# Patient Record
Sex: FEMALE | Race: WHITE | NOT HISPANIC OR LATINO | Employment: FULL TIME | URBAN - METROPOLITAN AREA
[De-identification: names, ages, dates, MRNs, and addresses within clinical notes are randomized per-mention and may not be internally consistent; named-entity substitution may affect disease eponyms.]

---
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
## 2025-02-17 DIAGNOSIS — M25.561 RIGHT KNEE PAIN, UNSPECIFIED CHRONICITY: Primary | ICD-10-CM

## 2025-02-18 ENCOUNTER — TELEPHONE (OUTPATIENT)
Dept: ORTHOPEDICS | Facility: CLINIC | Age: 20
End: 2025-02-18
Payer: COMMERCIAL

## 2025-02-18 NOTE — TELEPHONE ENCOUNTER
----- Message from Barbi Argenis sent at 2/18/2025 12:58 PM CST -----  Contact: Pts mother    ----- Message -----  From: Minda Florez  Sent: 2/18/2025  12:53 PM CST  To: [facility] Clinical Staff    .Type:  Patient Returning CallWho Called: Ifeoma /mother Who Left Message for Patient: inna Does the patient know what this is regarding?: unknown Would the patient rather a call back or a response via [facility]?  Call Best Call Back Number: [phone] Information:

## 2025-02-20 ENCOUNTER — HOSPITAL ENCOUNTER (OUTPATIENT)
Dept: RADIOLOGY | Facility: HOSPITAL | Age: 20
Discharge: HOME | End: 2025-02-20
Payer: COMMERCIAL

## 2025-02-20 ENCOUNTER — OFFICE VISIT (OUTPATIENT)
Dept: ORTHOPEDICS | Facility: CLINIC | Age: 20
End: 2025-02-20
Payer: COMMERCIAL

## 2025-02-20 VITALS — HEIGHT: 62 IN | WEIGHT: 130.06 LBS | BODY MASS INDEX: 23.93 KG/M2

## 2025-02-20 DIAGNOSIS — M25.561 RIGHT KNEE PAIN, UNSPECIFIED CHRONICITY: ICD-10-CM

## 2025-02-20 DIAGNOSIS — M25.562 PATELLAR PAIN, LEFT: ICD-10-CM

## 2025-02-20 DIAGNOSIS — M76.52 PATELLAR TENDINITIS OF LEFT KNEE: ICD-10-CM

## 2025-02-20 DIAGNOSIS — M25.562 CHRONIC PAIN OF LEFT KNEE: Primary | ICD-10-CM

## 2025-02-20 DIAGNOSIS — G89.29 CHRONIC PAIN OF LEFT KNEE: Primary | ICD-10-CM

## 2025-02-20 PROCEDURE — 73564 X-RAY EXAM KNEE 4 OR MORE: CPT | Mod: TC,LT

## 2025-02-20 RX ORDER — METHYLPREDNISOLONE 4 MG/1
2 TABLET ORAL DAILY
COMMUNITY
Start: 2024-09-17

## 2025-02-20 RX ORDER — CELECOXIB 200 MG/1
200 CAPSULE ORAL DAILY
COMMUNITY
Start: 2024-12-18

## 2025-02-20 RX ORDER — DICLOFENAC SODIUM 75 MG/1
75 TABLET, DELAYED RELEASE ORAL 2 TIMES DAILY PRN
Qty: 60 TABLET | Refills: 1 | Status: SHIPPED | OUTPATIENT
Start: 2025-02-20

## 2025-02-20 NOTE — PROGRESS NOTES
Cedric Waller PA-C  Orthopedic Surgery   [address] [facility] [place] [address]       Chief Complaint  Chief Complaint   Patient presents with    Left Knee - Pain     4/10         History of Present Illness  19-year-old female who presents with complaints of left knee pain onset roughly 6 months ago.  She denies any specific trauma or injury prior to onset.  Her pain is primarily anterolateral and inferior to the patella.  She has previously seen another orthopedist for this and underwent physical therapy, J bracing, NSAIDs, intra-articular steroid injection.  Nothing has provided lasting relief.  The injection provided maybe 1 month of relief.  She continues with 10/10 pain at times.  Describes her pain as dull and achy.  The pain wakes her up at night.  She is currently taking Celebrex 200 mg PRN.       Review of Systems  Review of Systems   Constitutional:  Negative for chills and fever.   Respiratory:  Negative for shortness of breath.    Cardiovascular:  Negative for chest pain.   All other systems reviewed and are negative.       Past Medical History  Past Medical History:   Diagnosis Date    Asthma 2021 2:42:33 PM    [facility] Historical - Respiratory: Asthma-No Additional Notes    Asthma 2021 2:42:33 PM    [facility] Historical - Respiratory: Asthma-No Additional Notes    Non- jaundice 2021 2:42:30 PM    [facility] Historical - Quick Add: Jaundice-No Additional Notes    Non- jaundice 2021 2:42:30 PM    [facility] Historical - Quick Add: Jaundice-No Additional Notes       Past Surgical History  Past Surgical History:   Procedure Laterality Date    TONSILLECTOMY         Allergies  Review of patient's allergies indicates:  No Known Allergies    Family History  No family history on file.    Vital Signs  There were no vitals filed for this visit.  Body mass index is 23.79 kg/m².    Physical Exam  Physical Exam  Constitutional:       General: She  is not in acute distress.     Appearance: Normal appearance. She is not ill-appearing.   Pulmonary:      Effort: Pulmonary effort is normal. No respiratory distress.   Musculoskeletal:      Left knee: No swelling, deformity, effusion, erythema, ecchymosis, bony tenderness or crepitus. Normal range of motion. Tenderness present over the patellar tendon. No medial joint line or lateral joint line tenderness. No LCL laxity or MCL laxity.Normal alignment and normal patellar mobility. Normal pulse.      Left lower leg: Normal.      Comments: Strength full in LLE grossly.   Neurovascular status grossly intact.   Compartments soft.    Neurological:      General: No focal deficit present.      Mental Status: She is alert and oriented to person, place, and time.      Sensory: Sensation is intact.      Motor: Motor function is intact.   Psychiatric:         Mood and Affect: Mood normal.         Thought Content: Thought content normal.         Judgment: Judgment normal.            Imaging  X-ray Knee Ortho Left with Flexion (XPD)  Narrative: EXAMINATION:  XR KNEE ORTHO LEFT WITH FLEXION (XPD)    CLINICAL HISTORY:  . Pain in right knee    TECHNIQUE:  AP standing view of both knees, PA flexion standing views of both knees, and Merchant views of both knees were performed. A lateral view of the left knee was also performed.    COMPARISON:  None    FINDINGS:  No acute osseous or soft tissue abnormality.  Impression: As above    Electronically signed by: Justus Rod MD  Date:    02/20/2025  Time:    13:06        ASSESSMENT: 19 y.o. female  with:   1. Chronic pain of left knee  -     diclofenac (VOLTAREN) 75 MG EC tablet; Take 1 tablet (75 mg total) by mouth 2 (two) times daily as needed.  Dispense: 60 tablet; Refill: 1    2. Patellar pain, left    3. Patellar tendinitis of left knee         PLAN:  Data:  I reviewed available old/outside records.  Multiple office visit notes from [facility].   I independently visualized xray images  "today.   Interpretation:  X-ray of the left knee today at [facility] demonstrates well-maintained joint spaces.  No acute fracture or dislocation.  Advanced imaging:  MRI of left knee impression from office visit on 12/18/2024 at [facility] indicating "Lateral patellar tilting without subluxation.  Normal TT TG distance.  No soft tissue swelling.  Minimal medial meniscus intermittent signal without tear.  Otherwise, unremarkable MRI of the left knee."  PT/OT:  Failed physical therapy  Medications:  Stop Celebrex   Start diclofenac 75 BID PRN  DME and weightbearing status:  Continue knee bracing  Weightbear as tolerated  Injections/Procedures:  Left knee intra-articular steroid injection on 12/18/2024 provided roughly 1 month of relief  Referral:  Referred to Dr. Matthews today  Previously seen Dr. Hernandez  Education:  I had a long discussion with the patient about the causes, treatments, and prognosis/natural history for their symptoms. We discussed effective ways to improve symptoms as well as what types of activities may make the symptoms/prognosis worse. We discussed signs and symptoms and other reasons to return to clinic sooner.   Return to clinic:  Follow up for Evaluation with Dr. Matthews.  His staff will schedule..         Cedric Waller PA-C  Orthopedic Surgery  "

## 2025-02-25 ENCOUNTER — PATIENT MESSAGE (OUTPATIENT)
Dept: SPORTS MEDICINE | Facility: CLINIC | Age: 20
End: 2025-02-25
Payer: COMMERCIAL

## 2025-02-27 ENCOUNTER — OFFICE VISIT (OUTPATIENT)
Dept: SPORTS MEDICINE | Facility: CLINIC | Age: 20
End: 2025-02-27
Payer: COMMERCIAL

## 2025-02-27 VITALS — WEIGHT: 130.06 LBS | BODY MASS INDEX: 23.93 KG/M2 | HEIGHT: 62 IN

## 2025-02-27 DIAGNOSIS — G89.29 CHRONIC PATELLOFEMORAL PAIN OF LEFT KNEE: Primary | ICD-10-CM

## 2025-02-27 DIAGNOSIS — M84.38XA STRESS FRACTURE OF OTHER SITE, INITIAL ENCOUNTER: ICD-10-CM

## 2025-02-27 DIAGNOSIS — M25.562 CHRONIC PATELLOFEMORAL PAIN OF LEFT KNEE: Primary | ICD-10-CM

## 2025-02-27 PROCEDURE — 99999 PR PBB SHADOW E&M-EST. PATIENT-LVL III: CPT | Mod: PBBFAC,,, | Performed by: ORTHOPAEDIC SURGERY

## 2025-02-27 NOTE — PATIENT INSTRUCTIONS
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    Chronic patellofemoral pain of left knee Yes    Stress fracture of other site, initial encounter       Plan:  MRI of left knee: include axial series of both T1 and T2 thin slicing  CT of left knee    Follow-up: AFTER IMAGING. Please reach out to us sooner if there are any problems between now and then.    About Dr. Cassie Matthews's Research & Publications    Give us Feedback:   Google: Leave Google Review  Healthgrades: Leave Healthgrades Review    After Hours Number: [phone]

## 2025-02-27 NOTE — PROGRESS NOTES
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 Pain is located in the anterior aspect of the knee, just below the patella, exacerbated by standing, walking, and pressure. She works as a pharmacy technician, requiring frequent standing. Pain wakes her up at night and persists even after sitting or lying down following prolonged standing. She rates her improvement as 0% since symptom onset.    She has undergone various treatments since September, initially seen by Dr. Hernandez. Treatments include injections, physical therapy, and using a J brace, all reported as ineffective. She was initially prescribed Celebrex, later switched to Diclofenac by a new doctor, but reports no difference with the medication change.    She mentions a history of knee trouble during her younger years when she participated in cheer and tumble, though no specific injury is noted. Dang has undergone an MRI, but the quality was reportedly not optimal for diagnosis.    Dang denies any catching, locking, or getting stuck of the knee. Injections: Dang received injections.  Physical therapy: Dang attended sessions 2-3 times per week for approximately 2 months. Exercises included leg lifts, squats, stairs, steps, and use of a machine.  Brace: Dang was given a J brace.  Ice cream: Mentioned as a treatment.    MEDICATIONS:  - Celebrex  - Diclofenac: Dang reports no noticeable difference with this medication  - Anti-inflammatories: Twice daily    WORK STATUS:  - Works as a pharmacy technician  - Job requires standing for long periods,  exacerbating knee pain  - Has been working in this position for two years  - Knee pain affecting ability to work comfortably      ROS:  Musculoskeletal: +joint pain  Neurological: +difficulty walking         Past Medical History:   Past Medical History:   Diagnosis Date    Asthma 2021 2:42:33 PM    [facility] Historical - Respiratory: Asthma-No Additional Notes    Asthma 2021 2:42:33 PM    [facility] Historical - Respiratory: Asthma-No Additional Notes    Non- jaundice 2021 2:42:30 PM    [facility] Historical - Quick Add: Jaundice-No Additional Notes    Non- jaundice 2021 2:42:30 PM    [facility] Historical - Quick Add: Jaundice-No Additional Notes     Past Surgical History:   Procedure Laterality Date    TONSILLECTOMY       No family history on file.  Social History[1]  Medication List with Changes/Refills   Current Medications    BUSPIRONE (BUSPAR) 10 MG TABLET    Take 1 tablet (10 mg total) by mouth 3 (three) times daily as needed (anxiety).    CELECOXIB (CELEBREX) 200 MG CAPSULE    Take 200 mg by mouth once daily.    DICLOFENAC (VOLTAREN) 75 MG EC TABLET    Take 1 tablet (75 mg total) by mouth 2 (two) times daily as needed.    METHYLPREDNISOLONE (MEDROL DOSEPACK) 4 MG TABLET    Take 2 mg by mouth once daily.    NORETHINDRONE-E.ESTRADIOL-IRON (LO LOESTRIN FE) 1 MG-10 MCG (24)/10 MCG (2) TAB    Take 1 tablet by mouth once daily.    SERTRALINE (ZOLOFT) 100 MG TABLET    1.5 tablets daily by mouth     Review of patient's allergies indicates:  No Known Allergies  ROS    Physical Exam:   Body mass index is 23.79 kg/m².  There were no vitals filed for this visit.   GENERAL: Well appearing, appropriate for stated age, no acute distress.  CARDIOVASCULAR: Pulses regular by peripheral palpation.  PULMONARY: Respirations are even and non-labored.  NEURO: Awake, alert, and oriented x 3.  PSYCH: Mood & affect are appropriate.  HEENT: Head is normocephalic and  atraumatic.              Left Knee Exam     Inspection   Scars: absent  Effusion: absent    Tenderness   The patient tender to palpation of the patellar tendon and patella (inferior pole of patella, proximal patella tendon).    Range of Motion   The patient has normal left knee ROM.  Extension:  0   Flexion:  120     Tests   Meniscus   Talat:  Medial - negative Lateral - negative  Stability   Lachman: normal (-1 to 2mm)   MCL - Valgus: normal (0 to 2mm)  LCL - Varus: normal (0 to 2mm)  Patella   Patellar Grind: positive  J-Sign: J sign present    Other   Sensation: normal    Comments:  Intact EHL, FHL, gastrocsoleus, and tibialis anterior. Sensation intact to light touch in superficial peroneal, deep peroneal, tibial, sural, and saphenous nerve distributions. Foot warm and well perfused with capillary refill of less than 2 seconds and palpable pedal pulses.        Muscle Strength   Left Lower Extremity   Hip Abduction: 5/5   Quadriceps:  5/5   Hamstrin/5     Vascular Exam       Left Pulses  Dorsalis Pedis:      2+  Posterior Tibial:      2+      Physical Exam    Right Knee: J SIGN PRESENT IN KNEE TRACKING.  IMAGING:  - MRI: Quality reported as suboptimal, lacking some desired slices and cuts             Imaging:    X-ray Knee Ortho Left with Flexion (XPD)  Narrative: EXAMINATION:  XR KNEE ORTHO LEFT WITH FLEXION (XPD)    CLINICAL HISTORY:  . Pain in right knee    TECHNIQUE:  AP standing view of both knees, PA flexion standing views of both knees, and Merchant views of both knees were performed. A lateral view of the left knee was also performed.    COMPARISON:  None    FINDINGS:  No acute osseous or soft tissue abnormality.  Impression: As above    Electronically signed by: Justus Rod MD  Date:    2025  Time:    13:06        Relevant imaging results reviewed and interpreted by me, discussed with the patient and / or family today.     Other Tests:         Assessment & Plan    PLAN SUMMARY:   MRI  and CT of the knee ordered   Follow-up appointment after obtaining new imaging studies   Dang to contact office to schedule follow-up, possibly as early as Monday    KNEE DISORDER:   Ordered MRI and CT of the knee.    FOLLOW-UP:   Follow up after obtaining new imaging studies.   Contact the office to schedule a follow-up visit, possibly as soon as Monday.             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    Chronic patellofemoral pain of left knee Yes    Stress fracture of other site, initial encounter         Plan:  MRI of left knee: include axial series of both T1 and T2 thin slicing  CT of left knee    Follow-up: AFTER IMAGING. Please reach out to us sooner if there are any problems between now and then.    About Dr. Cassie Matthews's Research & Publications    Give us Feedback:   Google: Leave Google Review  Healthgrades: Leave Healthgrades Review    After Hours Number: [phone]        Provider Note/Medical Decision Making:  Recalcitrant knee pain.  Have a previous MRI of poor quality from outside facility.  Concern for a stress reaction or stress fracture.      I discussed worrisome and red flag signs and symptoms with the patient. The patient expressed understanding and agreed to alert me immediately or to go to the emergency room if they experience any of these.   Treatment plan was developed with input from the patient/family, and they expressed understanding and agreement with the plan. All questions were answered today.          Yovanny Matthews MD  Orthopaedic Surgery & Sports Medicine       Disclaimer: This note was prepared using a voice  recognition system and is likely to have sound alike errors within the text.     This note was generated with the assistance of ambient listening technology. Verbal consent was obtained by the patient and accompanying visitor(s) for the recording of patient appointment to facilitate this note. I attest to having reviewed and edited the generated note for accuracy, though some syntax or spelling errors may persist. Please contact the author of this note for any clarification.    I, Ludmila Camilo, acted as a scribe for Yovanny Matthews MD for the duration of this office visit.           [1]   Social History  Socioeconomic History    Marital status: Single   Tobacco Use    Smoking status: Never     Passive exposure: Never    Smokeless tobacco: Never   Substance and Sexual Activity    Alcohol use: Never    Drug use: Never    Sexual activity: Not Currently     Partners: Male     Social Drivers of Health     Financial Resource Strain: Low Risk  (3/5/2024)    Overall Financial Resource Strain (CARDIA)     Difficulty of Paying Living Expenses: Not hard at all   Food Insecurity: No Food Insecurity (3/5/2024)    Hunger Vital Sign     Worried About Running Out of Food in the Last Year: Never true     Ran Out of Food in the Last Year: Never true   Transportation Needs: No Transportation Needs (3/5/2024)    PRAPARE - Transportation     Lack of Transportation (Medical): No     Lack of Transportation (Non-Medical): No   Physical Activity: Inactive (3/5/2024)    Exercise Vital Sign     Days of Exercise per Week: 0 days     Minutes of Exercise per Session: 0 min   Stress: Stress Concern Present (3/5/2024)    [employer] - Occupational Stress Questionnaire     Feeling of Stress : To some extent   Housing Stability: Low Risk  (3/5/2024)    Housing Stability Vital Sign     Unable to Pay for Housing in the Last Year: No     Number of Places Lived in the Last Year: 1     Unstable Housing in the Last  Year: No

## 2025-02-28 ENCOUNTER — HOSPITAL ENCOUNTER (OUTPATIENT)
Dept: RADIOLOGY | Facility: HOSPITAL | Age: 20
Discharge: HOME | End: 2025-02-28
Attending: ORTHOPAEDIC SURGERY
Payer: COMMERCIAL

## 2025-02-28 DIAGNOSIS — G89.29 CHRONIC PATELLOFEMORAL PAIN OF LEFT KNEE: ICD-10-CM

## 2025-02-28 DIAGNOSIS — M25.562 CHRONIC PATELLOFEMORAL PAIN OF LEFT KNEE: ICD-10-CM

## 2025-02-28 PROCEDURE — 73721 MRI JNT OF LWR EXTRE W/O DYE: CPT | Mod: 26,LT,, | Performed by: RADIOLOGY

## 2025-02-28 PROCEDURE — 73721 MRI JNT OF LWR EXTRE W/O DYE: CPT | Mod: TC,PN,LT

## 2025-02-28 PROCEDURE — 73700 CT LOWER EXTREMITY W/O DYE: CPT | Mod: TC,PN,LT

## 2025-02-28 PROCEDURE — 73700 CT LOWER EXTREMITY W/O DYE: CPT | Mod: 26,LT,, | Performed by: RADIOLOGY

## 2025-03-03 ENCOUNTER — TELEPHONE (OUTPATIENT)
Dept: INTERNAL MEDICINE | Facility: CLINIC | Age: 20
End: 2025-03-03
Payer: COMMERCIAL

## 2025-03-03 NOTE — TELEPHONE ENCOUNTER
I called pt per Dr Paige request to get pt scheduled to see Dr Paige or Chloe NP for an anxiety visit but there were no answer to pt phone . I left pt a vm to call the office back to get scheduled

## 2025-03-04 ENCOUNTER — OFFICE VISIT (OUTPATIENT)
Dept: INTERNAL MEDICINE | Facility: CLINIC | Age: 20
End: 2025-03-04
Payer: COMMERCIAL

## 2025-03-04 VITALS
OXYGEN SATURATION: 99 % | BODY MASS INDEX: 24.3 KG/M2 | DIASTOLIC BLOOD PRESSURE: 64 MMHG | SYSTOLIC BLOOD PRESSURE: 110 MMHG | WEIGHT: 132.06 LBS | HEART RATE: 81 BPM | RESPIRATION RATE: 16 BRPM | TEMPERATURE: 99 F | HEIGHT: 62 IN

## 2025-03-04 DIAGNOSIS — F41.1 GAD (GENERALIZED ANXIETY DISORDER): Primary | ICD-10-CM

## 2025-03-04 DIAGNOSIS — F33.1 MODERATE EPISODE OF RECURRENT MAJOR DEPRESSIVE DISORDER: ICD-10-CM

## 2025-03-04 DIAGNOSIS — Z00.00 ENCOUNTER FOR SCREENING AND PREVENTATIVE CARE: ICD-10-CM

## 2025-03-04 DIAGNOSIS — N92.1 PROLONGED MENSTRUATION: ICD-10-CM

## 2025-03-04 DIAGNOSIS — D50.9 IRON DEFICIENCY ANEMIA, UNSPECIFIED IRON DEFICIENCY ANEMIA TYPE: ICD-10-CM

## 2025-03-04 DIAGNOSIS — Z13.31 POSITIVE SCREENING FOR DEPRESSION ON 9-ITEM PATIENT HEALTH QUESTIONNAIRE (PHQ-9): ICD-10-CM

## 2025-03-04 PROCEDURE — 3078F DIAST BP <80 MM HG: CPT | Mod: CPTII,S$GLB,,

## 2025-03-04 PROCEDURE — 3074F SYST BP LT 130 MM HG: CPT | Mod: CPTII,S$GLB,,

## 2025-03-04 PROCEDURE — 99213 OFFICE O/P EST LOW 20 MIN: CPT | Mod: S$GLB,,,

## 2025-03-04 PROCEDURE — 1160F RVW MEDS BY RX/DR IN RCRD: CPT | Mod: CPTII,S$GLB,,

## 2025-03-04 PROCEDURE — 1159F MED LIST DOCD IN RCRD: CPT | Mod: CPTII,S$GLB,,

## 2025-03-04 PROCEDURE — 99999 PR PBB SHADOW E&M-EST. PATIENT-LVL IV: CPT | Mod: PBBFAC,,,

## 2025-03-04 PROCEDURE — 3008F BODY MASS INDEX DOCD: CPT | Mod: CPTII,S$GLB,,

## 2025-03-04 NOTE — PROGRESS NOTES
"Subjective:       Patient ID: Dang Blandon is a 19 y.o. female.    Chief Complaint: Anxiety    History of Present Illness    CHIEF COMPLAINT:  Patient presents today for follow-up on depression and anxiety management    DEPRESSION AND ANXIETY:  She reports improvement in depression symptoms since increasing Zoloft dosage. She takes Buspar 10 mg as needed for anticipated stressful situations, though she acknowledges misunderstanding the medication's intended use, having believed it was only for known upcoming stressful events. She denies regular use of Buspar.    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 She reports increased stress and irritability following this career advancement.          /64 (BP Location: Left arm, Patient Position: Sitting)   Pulse 81   Temp 98.8 °F (37.1 °C) (Tympanic)   Resp 16   Ht 5' 2" (1.575 m)   Wt 59.9 kg (132 lb 0.9 oz)   LMP 02/23/2025 (Exact Date)   SpO2 99%   BMI 24.15 kg/m²     Review of Systems   Constitutional:  Negative for chills and fever.   Eyes:  Negative for visual disturbance.   Respiratory:  Negative for chest tightness and shortness of breath.    Cardiovascular:  Negative for chest pain, palpitations and leg swelling.   Gastrointestinal:  Negative for constipation, diarrhea, nausea and vomiting.   Genitourinary:  Negative for difficulty urinating.   Neurological:  Negative for headaches.   Psychiatric/Behavioral:  The patient is nervous/anxious.    All other systems reviewed and are negative.      Objective:      Physical Exam  Vitals reviewed.   Constitutional:       General: She is not in acute distress.     Appearance: Normal appearance. She is not ill-appearing or toxic-appearing.   HENT:      Head: Normocephalic and atraumatic.   Eyes:      Pupils: Pupils are equal, round, and reactive to light.   Cardiovascular:      Rate and Rhythm: Normal rate and regular " rhythm.   Pulmonary:      Effort: Pulmonary effort is normal.      Breath sounds: Normal breath sounds.   Abdominal:      General: Bowel sounds are normal.      Palpations: Abdomen is soft.   Musculoskeletal:         General: Normal range of motion.      Cervical back: Normal range of motion and neck supple.   Skin:     General: Skin is warm and dry.   Neurological:      General: No focal deficit present.      Mental Status: She is alert and oriented to person, place, and time.   Psychiatric:         Mood and Affect: Mood normal.         Behavior: Behavior normal.         Thought Content: Thought content normal.         Judgment: Judgment normal.         Assessment:       1. LUCINDA (generalized anxiety disorder)    2. Moderate episode of recurrent major depressive disorder      LUCINDA-7 score of 12 = moderately severe anxiety  PHQ-9 score of 9 = moderate depression  Plan:       Dang was seen today for anxiety.    Diagnoses and all orders for this visit:    LUCINDA (generalized anxiety disorder)    Moderate episode of recurrent major depressive disorder    Assessment & Plan    IMPRESSION:  - Assessed patient's current medication regimen and anxiety symptoms  - Determined Buspar was being underutilized for anxiety management  - Evaluated need for additional interventions such as counseling or therapy    DEPRESSION:  - Continue Zoloft at current dose.  - Monitor the patient's depression and evaluate the effectiveness of Zoloft.  - Patient reports that increasing the Zoloft dosage has been helpful in managing depression.  - Suggest counseling or therapy as an additional treatment option for depression.    ANXIETY:  - Increase Buspar to 2-3 times daily (morning and night, or breakfast, lunch, and dinner) for anxiety management.  - Explain that Buspar can be taken up to 3 times daily for anxiety management.  - Monitor the patient's anxiety symptoms, particularly in relation to stressful situations at work.  - Evaluate the  current use of Buspar, noting that the patient is only using it for breakthrough anxiety.  - Assess that the current use of Buspar is not optimal for managing the patient's anxiety.  - Recommend increasing Buspar usage to 2-3 times daily for better anxiety control.  - Reassess the effectiveness of increased Buspar usage before considering changes to Zoloft dosage.    FOLLOW UP:  - Discussed the option of counseling or therapy as a potential resource for additional support.  - Follow up in May for annual follow-up and bloodwork.       Follow up in about 2 months (around 5/4/2025), or if symptoms worsen or fail to improve, for annual with labs.

## 2025-03-04 NOTE — PROGRESS NOTES
Subjective:       Patient ID: Dang Dia Head is a 19 y.o. female.    Chief Complaint: No chief complaint on file.        LMP 02/23/2025 (Exact Date)     Review of Systems    Objective:      Physical Exam    Assessment:       1. Anxiety and depression    2. Prolonged menstruation    3. Iron deficiency anemia, unspecified iron deficiency anemia type    4. Encounter for screening and preventative care    5. LUCINDA (generalized anxiety disorder)    6. Moderate episode of recurrent major depressive disorder        Plan:       Diagnoses and all orders for this visit:    Anxiety and depression  -     TSH; Future    Prolonged menstruation  -     CBC Auto Differential; Future    Iron deficiency anemia, unspecified iron deficiency anemia type  -     CBC Auto Differential; Future    Encounter for screening and preventative care  -     Comprehensive Metabolic Panel; Future  -     TSH; Future  -     Lipid Panel; Future  -     CBC Auto Differential; Future    LUCINDA (generalized anxiety disorder)    Moderate episode of recurrent major depressive disorder      There are no Patient Instructions on file for this visit.

## 2025-03-04 NOTE — PATIENT INSTRUCTIONS
Try taking buspar morning and night, if not morning, lunch, and night to see if anxiety becomes more manageable.

## 2025-03-05 ENCOUNTER — OFFICE VISIT (OUTPATIENT)
Dept: SPORTS MEDICINE | Facility: CLINIC | Age: 20
End: 2025-03-05
Payer: COMMERCIAL

## 2025-03-05 VITALS — BODY MASS INDEX: 24.3 KG/M2 | HEIGHT: 62 IN | WEIGHT: 132.06 LBS

## 2025-03-05 DIAGNOSIS — M23.042 CYST OF ANTERIOR HORN OF LATERAL MENISCUS OF LEFT KNEE: Primary | ICD-10-CM

## 2025-03-05 DIAGNOSIS — M25.562 CHRONIC PATELLOFEMORAL PAIN OF LEFT KNEE: ICD-10-CM

## 2025-03-05 DIAGNOSIS — M84.469A INSUFFICIENCY FRACTURE OF TIBIA, INITIAL ENCOUNTER: ICD-10-CM

## 2025-03-05 DIAGNOSIS — G89.29 CHRONIC PATELLOFEMORAL PAIN OF LEFT KNEE: ICD-10-CM

## 2025-03-05 DIAGNOSIS — T14.8XXA BONE BRUISE: ICD-10-CM

## 2025-03-05 PROCEDURE — 99214 OFFICE O/P EST MOD 30 MIN: CPT | Mod: S$GLB,,, | Performed by: ORTHOPAEDIC SURGERY

## 2025-03-05 PROCEDURE — 3008F BODY MASS INDEX DOCD: CPT | Mod: CPTII,S$GLB,, | Performed by: ORTHOPAEDIC SURGERY

## 2025-03-05 PROCEDURE — 1159F MED LIST DOCD IN RCRD: CPT | Mod: CPTII,S$GLB,, | Performed by: ORTHOPAEDIC SURGERY

## 2025-03-05 PROCEDURE — 99999 PR PBB SHADOW E&M-EST. PATIENT-LVL IV: CPT | Mod: PBBFAC,,, | Performed by: ORTHOPAEDIC SURGERY

## 2025-03-05 NOTE — H&P (VIEW-ONLY)
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 She reports pain in the anteromedial and anterolateral aspects of the knee. Pain is localized to a sensitive area. She indicates tenderness when pressure is applied to the anterior tibial edge.    She has undergone extensive physical therapy, attending sessions 2-3 times per week for 2-3 months. She received a steroid injection in January. Despite these conservative treatments, symptoms have persisted.    Her pain and associated symptoms have been impacting her daily activities, including her ability to work.    A previous MRI showed some fluid in the knee area. A more recent MRI in December 2024 revealed cystic formations over the meniscus and white signal changes in the tibial bone, indicative of potential bone bruising or weakness.    Dang denies pain in certain areas of the knee when questioned by the practitioner. Physical therapy: 2-3 times per week for 2-3 months since July 2025, no relief.  Steroid injection: June 2025, no relief.    WORK STATUS:  - Employed  - Knee pain affecting ability to work since July  - Potential return to work after knee surgery estimated at minimum of a couple weeks      ROS:  Musculoskeletal: +joint pain       Recall HPI 2/27/2025  Dang presents with right knee pain that started in July 2025. Pain is located in the anterior aspect of the knee, just below the patella, exacerbated by standing, walking, and pressure. She works as a pharmacy technician, requiring frequent standing. Pain wakes her up at night and persists even after sitting or  lying down following prolonged standing. She rates her improvement as 0% since symptom onset. She has undergone various treatments since September, initially seen by Dr. Hernandez. Treatments include injections, physical therapy, and using a J brace, all reported as ineffective. She was initially prescribed Celebrex, later switched to Diclofenac by a new doctor, but reports no difference with the medication change. She mentions a history of knee trouble during her younger years when she participated in cheer and tumble, though no specific injury is noted. Dang has undergone an MRI, but the quality was reportedly not optimal for diagnosis. Dang denies any catching, locking, or getting stuck of the knee. Injections: Dang received injections. Physical therapy: Dang attended sessions 2-3 times per week for approximately 2 months. Exercises included leg lifts, squats, stairs, steps, and use of a machine.             Past Medical History:   Past Medical History:   Diagnosis Date    Asthma 2021 2:42:33 PM    [facility] - Respiratory: Asthma-No Additional Notes    Asthma 2021 2:42:33 PM    [facility] Historical - Respiratory: Asthma-No Additional Notes    Non- jaundice 2021 2:42:30 PM    [facility] Historical - Quick Add: Jaundice-No Additional Notes    Non- jaundice 2021 2:42:30 PM    [facility] - Quick Add: Jaundice-No Additional Notes     Past Surgical History:   Procedure Laterality Date    TONSILLECTOMY       No family history on file.  Social History[1]  Medication List with Changes/Refills   Current Medications    BUSPIRONE (BUSPAR) 10 MG TABLET    Take 1 tablet (10 mg total) by mouth 3 (three) times daily as needed (anxiety).    DICLOFENAC (VOLTAREN) 75 MG EC TABLET    Take 1 tablet (75 mg total) by mouth 2 (two) times daily as needed.    NORETHINDRONE-E.ESTRADIOL-IRON (LO LOESTRIN FE) 1 MG-10 MCG (24)/10 MCG (2) TAB    Take 1 tablet by mouth  once daily.    SERTRALINE (ZOLOFT) 100 MG TABLET    1.5 tablets daily by mouth     Review of patient's allergies indicates:  No Known Allergies  ROS    Physical Exam:   Body mass index is 24.15 kg/m².  There were no vitals filed for this visit.   GENERAL: Well appearing, appropriate for stated age, no acute distress.  CARDIOVASCULAR: Pulses regular by peripheral palpation.  PULMONARY: Respirations are even and non-labored.  NEURO: Awake, alert, and oriented x 3.  PSYCH: Mood & affect are appropriate.  HEENT: Head is normocephalic and atraumatic.              Left Knee Exam     Inspection   Scars: absent  Effusion: absent    Tenderness   Left knee tenderness location: lateral and medial to middle patelal tella tendon, anterior proximal tibia.    Range of Motion   The patient has normal left knee ROM.  Extension:  0   Flexion:  120     Tests   Meniscus   Talat:  Medial - negative Lateral - negative  Patella   Patellar apprehension: negative  Patellar Tracking: normal  J-Sign: J sign absent    Other   Sensation: normal    Comments:  Intact EHL, FHL, gastrocsoleus, and tibialis anterior. Sensation intact to light touch in superficial peroneal, deep peroneal, tibial, sural, and saphenous nerve distributions. Foot warm and well perfused with capillary refill of less than 2 seconds and palpable pedal pulses.      Muscle Strength   Left Lower Extremity   Hip Abduction: 5/5   Quadriceps:  5/5   Hamstrin/5     Vascular Exam       Left Pulses  Dorsalis Pedis:      2+  Posterior Tibial:      2+          Physical Exam    Musculoskeletal: TENDERNESS ON ANTERIOR TIBIAL EDGE. PAIN ON DEEP PALPATION OF BONE.  IMAGING:  - MRI of the knee: 2024, White signal (fluid) above the meniscus indicating a cyst, White signal in the tibia suggesting bone bruising or weakness, No obvious meniscus tear, ACL appears intact  - MRI of the knee: Before 2024, Some fluid noted, Did not clearly show the bone changes seen in the  current MRI             Imaging:    CT Knee Without Contrast Left  Narrative: EXAMINATION:  CT KNEE WITHOUT CONTRAST LEFT    CLINICAL HISTORY:  Knee pain, chronic, positive xray (Age >= 5y);  Pain in left knee    TECHNIQUE:  The left knee was scanned without intravenous contrast.  Multiplanar reformats are reviewed.    COMPARISON:  Left knee MRI, 02/28/2025.    FINDINGS:  There is no fracture or dislocation of the left knee.  The patellofemoral compartment has a normal appearance and alignment.  No cortical erosion or osseous destruction.  No sclerotic lesion.  No joint effusion.  No muscle atrophy.  No soft tissue mass.  Impression: Normal left knee CT.    Electronically signed by: Ezequiel Osorio MD  Date:    02/28/2025  Time:    13:27  MRI Knee Without Contrast Left  Narrative: EXAM:  MRI KNEE WITHOUT CONTRAST LEFT    CLINICAL INDICATION: Pain in left knee.    TECHNIQUE: MR left knee performed with multiplanar multisequence imaging.    COMPARISON STUDY:   Left knee x-ray 02/20/2025, MR left knee 12/03/2024 ([facility]).    FINDINGS: No change.    There is no internal derangement with preservation of signal intensity and morphology of the menisci, collateral ligaments, and cruciate ligaments.    There is a fluid-filled joint recess versus a thinly septated ganglion-like cyst anterior intercondylar notch, overlying the anterior root attachment lateral meniscus, 1 x 0.4 x 0.9 cm.  Negative for meniscal tear.    Extensor mechanism is intact.  There is no knee joint effusion.  Mild lateral patellar tilting without subluxation.  Normal TT TG distance, 8 mm.    The chondral surfaces are well preserved.    The bone marrow signal intensity is normal.    There is no soft tissue swelling.  Impression:  No change compared with December 2024.  Fluid-filled joint recess versus ganglion-like cyst, anterior condyle notch overlying anterior root attachment lateral meniscus, 1 cm.  Negative for meniscal tear.  Mild lateral patellar  tilting.  Otherwise, unremarkable MR left knee.    Finalized on: 2025 8:14 AM By:  Darren Merino MD  [facility]# [id number]      2025 08:16:[id number]     [facility]        Relevant imaging results reviewed and interpreted by me, discussed with the patient and / or family today.    Other Tests:         Assessment & Plan    PLAN SUMMARY:   Schedule arthroscopic knee surgery on an upcoming Tuesday   Pre-operative lab work and visit with PA   Complete labs at the [facility] before surgery   Use crutches for 1-2 weeks post-surgery, possibly longer   Physical therapy needed post-operatively   Return to work minimum 2 weeks after surgery, when comfortable   Follow up with PA for pre-op visit before surgery    KNEE PAIN AND MENISCUS ISSUES:   Dang understands the risks and benefits and elects to proceed with arthroscopic surgery to address knee pain.   Outpatient procedure under general anesthesia.   Risks include pain, infection, bleeding, damage to structures, blood clots, and extremely rare risks of limb/life loss.   Postoperative symptoms can include initial worsening of symptoms.   Physical therapy needed post-operatively.   Use crutches for 1-2 weeks after surgery, or longer depending on intraoperative findings.   Return to work when comfortable, minimum of 2 weeks after surgery.    PRE-OPERATIVE PREPARATION:   Scheduled for pre-operative lab work and pre-op visit with PA.   Complete labs at the [facility] before surgery.   Plan to schedule surgery on an upcoming Tuesday, pending patient availability.   Follow up with PA for pre-op visit before surgery.             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  Anterior tibial subchondral insufficiency fracture    Encounter Diagnoses   Name Primary?    Chronic patellofemoral pain of left knee     Bone bruise     Cyst of anterior horn of lateral meniscus of left knee Yes    Insufficiency fracture of tibia, initial encounter         Plan:  New MRI and CT reviewed today in detail   Discussed treatment options including continuing physical therapy and non-operative conservative management versus surgical intervention  Left knee arthroscopy, possible parameniscal cyst excision, possible fixation of tibial subchondral bone injury with Ossiofiber, any indicated procedures.  Pre-op with Kourtney Prior to surgery  Pre-op labs prior to surgery  Post op [facility] [place]    Follow-up: SURGERY. Please reach out to us sooner if there are any problems between now and then.    About Dr. Cassie Matthews's Research & Publications    Give us Feedback:   Google: Leave Google Review  Healthgrades: Leave Healthgrades Review    After Hours Number: [phone]        Provider Note/Medical Decision Making:  This patient has recalcitrant pain that is point tender to touch right at the area where she has a parameniscal ganglion cyst anteriorly and also on the tibial bone where she has bone edema.  We discussed various treatment options and she has trialed a very long course of nonsurgical management without success.  We talked about continued non operative management versus also surgical treatment and ultimately the patient and family would like to proceed with surgical management.  We discussed the fact that and there is some variability and it may not fix all the pain or during the diagnostic arthroscopy we may not find anything that is able to be intervened on.  However considering her long treatment course I think it is reasonable to consider this and considering  the damage to the subchondral bone performing fixation of this area with osteo fiber or other screw to provide support to the subchondral bone.    I had a long discussion with the patient about treatment options, including operative and nonoperative treatments. We discussed pros and cons of each including risks pertinent to surgery including pain, infection, bleeding, damage to adjacent structures like nerves and blood vessels, failure to heal, need for future surgeries, stiffness, instability, loss of limb, anesthesia risks like stroke, blood clot, loss of life. The details of the surgical procedure were explained, including the location of probable incisions and a description of possible hardware and/or grafts to be used. Alternatives to both operative and non-operative options with associated risks and benefits were discussed.     The patient understands the likely convalescence after surgery and, in particular, the expected postop rehab and recovery course. The outlined risks and potential complications of the proposed procedure include but are not limited to: infection, poor wound healing, scarring, deformity, stiffness, swelling, continued or recurrent pain, instability, hardware or prosthetic failure if implanted, symptomatic hardware requiring removal, dislocation, weakness, neurovascular injury, numbness, chronic regional pain disorder, tissue nonhealing/irreparability/retear, subsequent contralateral limb injury or pathology, chondral injury, arthritis, fracture, blood clot formation, inability to return to previous level of activity, anesthetic or regional block complication up to death, need for additional procedure as indicated intraoperatively, and potential need for further surgery.     The patient was also informed and understands that the risks of surgery are greater for patients with a current condition or history of heart disease, obesity, clotting disorders, recurrent infections, steroid use,  current or past smoking, and factors such as sedentary lifestyle and noncompliance with medications, therapy or follow-up. The degree of the increased risk is hard to estimate with any degree of precision. If applicable, smoking cessation was discussed. We discussed the possibility of need for later hardware removal in the case that hardware was used. We discussed common and uncommon risks, and discussed patient specific factors that may increase the risks present with surgery. All questions were answered. The patient expressed understanding of the pros and cons of surgery and wanted to proceed with surgical treatment.    I discussed worrisome and red flag signs and symptoms with the patient. The patient expressed understanding and agreed to alert me immediately or to go to the emergency room if they experience any of these.   Treatment plan was developed with input from the patient/family, and they expressed understanding and agreement with the plan. All questions were answered today.          Yovanny Matthews MD  Orthopaedic Surgery & Sports Medicine       Disclaimer: This note was prepared using a voice recognition system and is likely to have sound alike errors within the text.     This note was generated with the assistance of ambient listening technology. Verbal consent was obtained by the patient and accompanying visitor(s) for the recording of patient appointment to facilitate this note. I attest to having reviewed and edited the generated note for accuracy, though some syntax or spelling errors may persist. Please contact the author of this note for any clarification.    I, Ludmila Camilo, acted as a scribe for Yovanny Matthews MD for the duration of this office visit.         [1]   Social History  Socioeconomic History    Marital status: Single   Tobacco Use    Smoking status: Never     Passive exposure: Never    Smokeless tobacco: Never   Substance and Sexual Activity    Alcohol use: Never     Drug use: Never    Sexual activity: Not Currently     Partners: Male     Social Drivers of Health     Financial Resource Strain: Low Risk  (3/5/2024)    Overall Financial Resource Strain (CARDIA)     Difficulty of Paying Living Expenses: Not hard at all   Food Insecurity: No Food Insecurity (3/5/2024)    Hunger Vital Sign     Worried About Running Out of Food in the Last Year: Never true     Ran Out of Food in the Last Year: Never true   Transportation Needs: No Transportation Needs (3/5/2024)    PRAPARE - Transportation     Lack of Transportation (Medical): No     Lack of Transportation (Non-Medical): No   Physical Activity: Inactive (3/5/2024)    Exercise Vital Sign     Days of Exercise per Week: 0 days     Minutes of Exercise per Session: 0 min   Stress: Stress Concern Present (3/5/2024)    [employer] [place] [employer] - Occupational Stress Questionnaire     Feeling of Stress : To some extent   Housing Stability: Low Risk  (3/5/2024)    Housing Stability Vital Sign     Unable to Pay for Housing in the Last Year: No     Number of Places Lived in the Last Year: 1     Unstable Housing in the Last Year: No

## 2025-03-05 NOTE — PATIENT INSTRUCTIONS
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  Anterior tibial subchondral insufficiency fracture    Encounter Diagnoses   Name Primary?    Chronic patellofemoral pain of left knee     Bone bruise     Cyst of anterior horn of lateral meniscus of left knee Yes    Insufficiency fracture of tibia, initial encounter         Plan:  New MRI and CT reviewed today in detail   Discussed treatment options including continuing physical therapy and non-operative conservative management versus surgical intervention  Left knee arthroscopy, possible parameniscal cyst excision, possible fixation of tibial subchondral bone injury with Ossiofiber, any indicated procedures.  Pre-op with Kourtney Prior to surgery  Pre-op labs prior to surgery  Post op [facility] in [place]    Follow-up: SURGERY. Please reach out to us sooner if there are any problems between now and then.    About Dr. Cassie Matthews's Research & Publications    Give us Feedback:   Google: Leave Google Review  Healthgrades: Leave Healthgrades Review    After Hours Number: [phone]

## 2025-03-05 NOTE — PROGRESS NOTES
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 She reports pain in the anteromedial and anterolateral aspects of the knee. Pain is localized to a sensitive area. She indicates tenderness when pressure is applied to the anterior tibial edge.    She has undergone extensive physical therapy, attending sessions 2-3 times per week for 2-3 months. She received a steroid injection in January. Despite these conservative treatments, symptoms have persisted.    Her pain and associated symptoms have been impacting her daily activities, including her ability to work.    A previous MRI showed some fluid in the knee area. A more recent MRI in December 2024 revealed cystic formations over the meniscus and white signal changes in the tibial bone, indicative of potential bone bruising or weakness.    Dang denies pain in certain areas of the knee when questioned by the practitioner. Physical therapy: 2-3 times per week for 2-3 months since July 2025, no relief.  Steroid injection: June 2025, no relief.    WORK STATUS:  - Employed  - Knee pain affecting ability to work since July  - Potential return to work after knee surgery estimated at minimum of a couple weeks      ROS:  Musculoskeletal: +joint pain       Recall HPI 2/27/2025  Dang presents with right knee pain that started in July 2025. Pain is located in the anterior aspect of the knee, just below the patella, exacerbated by standing, walking, and pressure. She works as a pharmacy technician, requiring frequent standing. Pain wakes her up at night and persists even after sitting or  lying down following prolonged standing. She rates her improvement as 0% since symptom onset. She has undergone various treatments since September, initially seen by Dr. Hernandez. Treatments include injections, physical therapy, and using a J brace, all reported as ineffective. She was initially prescribed Celebrex, later switched to Diclofenac by a new doctor, but reports no difference with the medication change. She mentions a history of knee trouble during her younger years when she participated in cheer and tumble, though no specific injury is noted. Dang has undergone an MRI, but the quality was reportedly not optimal for diagnosis. Dang denies any catching, locking, or getting stuck of the knee. Injections: Dang received injections. Physical therapy: Dang attended sessions 2-3 times per week for approximately 2 months. Exercises included leg lifts, squats, stairs, steps, and use of a machine.             Past Medical History:   Past Medical History:   Diagnosis Date    Asthma 2021 2:42:33 PM    [facility] Historical - Respiratory: Asthma-No Additional Notes    Asthma 2021 2:42:33 PM    [facility] Historical - Respiratory: Asthma-No Additional Notes    Non- jaundice 2021 2:42:30 PM    [facility] Historical - Quick Add: Jaundice-No Additional Notes    Non- jaundice 2021 2:42:30 PM    [facility] - Quick Add: Jaundice-No Additional Notes     Past Surgical History:   Procedure Laterality Date    TONSILLECTOMY       No family history on file.  Social History[1]  Medication List with Changes/Refills   Current Medications    BUSPIRONE (BUSPAR) 10 MG TABLET    Take 1 tablet (10 mg total) by mouth 3 (three) times daily as needed (anxiety).    DICLOFENAC (VOLTAREN) 75 MG EC TABLET    Take 1 tablet (75 mg total) by mouth 2 (two) times daily as needed.    NORETHINDRONE-E.ESTRADIOL-IRON (LO LOESTRIN FE) 1 MG-10 MCG (24)/10 MCG (2) TAB    Take 1 tablet by mouth  once daily.    SERTRALINE (ZOLOFT) 100 MG TABLET    1.5 tablets daily by mouth     Review of patient's allergies indicates:  No Known Allergies  ROS    Physical Exam:   Body mass index is 24.15 kg/m².  There were no vitals filed for this visit.   GENERAL: Well appearing, appropriate for stated age, no acute distress.  CARDIOVASCULAR: Pulses regular by peripheral palpation.  PULMONARY: Respirations are even and non-labored.  NEURO: Awake, alert, and oriented x 3.  PSYCH: Mood & affect are appropriate.  HEENT: Head is normocephalic and atraumatic.              Left Knee Exam     Inspection   Scars: absent  Effusion: absent    Tenderness   Left knee tenderness location: lateral and medial to middle patelal tella tendon, anterior proximal tibia.    Range of Motion   The patient has normal left knee ROM.  Extension:  0   Flexion:  120     Tests   Meniscus   Talat:  Medial - negative Lateral - negative  Patella   Patellar apprehension: negative  Patellar Tracking: normal  J-Sign: J sign absent    Other   Sensation: normal    Comments:  Intact EHL, FHL, gastrocsoleus, and tibialis anterior. Sensation intact to light touch in superficial peroneal, deep peroneal, tibial, sural, and saphenous nerve distributions. Foot warm and well perfused with capillary refill of less than 2 seconds and palpable pedal pulses.      Muscle Strength   Left Lower Extremity   Hip Abduction: 5/5   Quadriceps:  5/5   Hamstrin/5     Vascular Exam       Left Pulses  Dorsalis Pedis:      2+  Posterior Tibial:      2+          Physical Exam    Musculoskeletal: TENDERNESS ON ANTERIOR TIBIAL EDGE. PAIN ON DEEP PALPATION OF BONE.  IMAGING:  - MRI of the knee: 2024, White signal (fluid) above the meniscus indicating a cyst, White signal in the tibia suggesting bone bruising or weakness, No obvious meniscus tear, ACL appears intact  - MRI of the knee: Before 2024, Some fluid noted, Did not clearly show the bone changes seen in the  current MRI             Imaging:    CT Knee Without Contrast Left  Narrative: EXAMINATION:  CT KNEE WITHOUT CONTRAST LEFT    CLINICAL HISTORY:  Knee pain, chronic, positive xray (Age >= 5y);  Pain in left knee    TECHNIQUE:  The left knee was scanned without intravenous contrast.  Multiplanar reformats are reviewed.    COMPARISON:  Left knee MRI, 02/28/2025.    FINDINGS:  There is no fracture or dislocation of the left knee.  The patellofemoral compartment has a normal appearance and alignment.  No cortical erosion or osseous destruction.  No sclerotic lesion.  No joint effusion.  No muscle atrophy.  No soft tissue mass.  Impression: Normal left knee CT.    Electronically signed by: Ezequiel Osorio MD  Date:    02/28/2025  Time:    13:27  MRI Knee Without Contrast Left  Narrative: EXAM:  MRI KNEE WITHOUT CONTRAST LEFT    CLINICAL INDICATION: Pain in left knee.    TECHNIQUE: MR left knee performed with multiplanar multisequence imaging.    COMPARISON STUDY:   Left knee x-ray 02/20/2025, MR left knee 12/03/2024 ([facility]).    FINDINGS: No change.    There is no internal derangement with preservation of signal intensity and morphology of the menisci, collateral ligaments, and cruciate ligaments.    There is a fluid-filled joint recess versus a thinly septated ganglion-like cyst anterior intercondylar notch, overlying the anterior root attachment lateral meniscus, 1 x 0.4 x 0.9 cm.  Negative for meniscal tear.    Extensor mechanism is intact.  There is no knee joint effusion.  Mild lateral patellar tilting without subluxation.  Normal TT TG distance, 8 mm.    The chondral surfaces are well preserved.    The bone marrow signal intensity is normal.    There is no soft tissue swelling.  Impression:  No change compared with December 2024.  Fluid-filled joint recess versus ganglion-like cyst, anterior condyle notch overlying anterior root attachment lateral meniscus, 1 cm.  Negative for meniscal tear.  Mild lateral patellar  tilting.  Otherwise, unremarkable MR left knee.    Finalized on: 2025 8:14 AM By:  Darren Merino MD  [facility]# [id number]      2025 08:16:[id number]     [facility]        Relevant imaging results reviewed and interpreted by me, discussed with the patient and / or family today.    Other Tests:         Assessment & Plan    PLAN SUMMARY:   Schedule arthroscopic knee surgery on an upcoming Tuesday   Pre-operative lab work and visit with PA   Complete labs at the [facility] before surgery   Use crutches for 1-2 weeks post-surgery, possibly longer   Physical therapy needed post-operatively   Return to work minimum 2 weeks after surgery, when comfortable   Follow up with PA for pre-op visit before surgery    KNEE PAIN AND MENISCUS ISSUES:   Dang understands the risks and benefits and elects to proceed with arthroscopic surgery to address knee pain.   Outpatient procedure under general anesthesia.   Risks include pain, infection, bleeding, damage to structures, blood clots, and extremely rare risks of limb/life loss.   Postoperative symptoms can include initial worsening of symptoms.   Physical therapy needed post-operatively.   Use crutches for 1-2 weeks after surgery, or longer depending on intraoperative findings.   Return to work when comfortable, minimum of 2 weeks after surgery.    PRE-OPERATIVE PREPARATION:   Scheduled for pre-operative lab work and pre-op visit with PA.   Complete labs at the [facility] before surgery.   Plan to schedule surgery on an upcoming Tuesday, pending patient availability.   Follow up with PA for pre-op visit before surgery.             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  Anterior tibial subchondral insufficiency fracture    Encounter Diagnoses   Name Primary?    Chronic patellofemoral pain of left knee     Bone bruise     Cyst of anterior horn of lateral meniscus of left knee Yes    Insufficiency fracture of tibia, initial encounter         Plan:  New MRI and CT reviewed today in detail   Discussed treatment options including continuing physical therapy and non-operative conservative management versus surgical intervention  Left knee arthroscopy, possible parameniscal cyst excision, possible fixation of tibial subchondral bone injury with Ossiofiber, any indicated procedures.  Pre-op with [facility] Prior to surgery  Pre-op labs prior to surgery  Post op [facility] in Mar    Follow-up: SURGERY. Please reach out to us sooner if there are any problems between now and then.    About Dr. Cassie Matthews's Research & Publications    Give us Feedback:   Google: Leave Google Review  Healthgrades: Leave Healthgrades Review    After Hours Number: [phone]        Provider Note/Medical Decision Making:  This patient has recalcitrant pain that is point tender to touch right at the area where she has a parameniscal ganglion cyst anteriorly and also on the tibial bone where she has bone edema.  We discussed various treatment options and she has trialed a very long course of nonsurgical management without success.  We talked about continued non operative management versus also surgical treatment and ultimately the patient and family would like to proceed with surgical management.  We discussed the fact that and there is some variability and it may not fix all the pain or during the diagnostic arthroscopy we may not find anything that is able to be intervened on.  However considering her long treatment course I think it is reasonable to consider this and considering  the damage to the subchondral bone performing fixation of this area with osteo fiber or other screw to provide support to the subchondral bone.    I had a long discussion with the patient about treatment options, including operative and nonoperative treatments. We discussed pros and cons of each including risks pertinent to surgery including pain, infection, bleeding, damage to adjacent structures like nerves and blood vessels, failure to heal, need for future surgeries, stiffness, instability, loss of limb, anesthesia risks like stroke, blood clot, loss of life. The details of the surgical procedure were explained, including the location of probable incisions and a description of possible hardware and/or grafts to be used. Alternatives to both operative and non-operative options with associated risks and benefits were discussed.     The patient understands the likely convalescence after surgery and, in particular, the expected postop rehab and recovery course. The outlined risks and potential complications of the proposed procedure include but are not limited to: infection, poor wound healing, scarring, deformity, stiffness, swelling, continued or recurrent pain, instability, hardware or prosthetic failure if implanted, symptomatic hardware requiring removal, dislocation, weakness, neurovascular injury, numbness, chronic regional pain disorder, tissue nonhealing/irreparability/retear, subsequent contralateral limb injury or pathology, chondral injury, arthritis, fracture, blood clot formation, inability to return to previous level of activity, anesthetic or regional block complication up to death, need for additional procedure as indicated intraoperatively, and potential need for further surgery.     The patient was also informed and understands that the risks of surgery are greater for patients with a current condition or history of heart disease, obesity, clotting disorders, recurrent infections, steroid use,  current or past smoking, and factors such as sedentary lifestyle and noncompliance with medications, therapy or follow-up. The degree of the increased risk is hard to estimate with any degree of precision. If applicable, smoking cessation was discussed. We discussed the possibility of need for later hardware removal in the case that hardware was used. We discussed common and uncommon risks, and discussed patient specific factors that may increase the risks present with surgery. All questions were answered. The patient expressed understanding of the pros and cons of surgery and wanted to proceed with surgical treatment.    I discussed worrisome and red flag signs and symptoms with the patient. The patient expressed understanding and agreed to alert me immediately or to go to the emergency room if they experience any of these.   Treatment plan was developed with input from the patient/family, and they expressed understanding and agreement with the plan. All questions were answered today.          Yovanny Matthews MD  Orthopaedic Surgery & Sports Medicine       Disclaimer: This note was prepared using a voice recognition system and is likely to have sound alike errors within the text.     This note was generated with the assistance of ambient listening technology. Verbal consent was obtained by the patient and accompanying visitor(s) for the recording of patient appointment to facilitate this note. I attest to having reviewed and edited the generated note for accuracy, though some syntax or spelling errors may persist. Please contact the author of this note for any clarification.    I, Ludmila Camilo, acted as a scribe for Yovanny Matthews MD for the duration of this office visit.         [1]   Social History  Socioeconomic History    Marital status: Single   Tobacco Use    Smoking status: Never     Passive exposure: Never    Smokeless tobacco: Never   Substance and Sexual Activity    Alcohol use: Never     Drug use: Never    Sexual activity: Not Currently     Partners: Male     Social Drivers of Health     Financial Resource Strain: Low Risk  (3/5/2024)    Overall Financial Resource Strain (CARDIA)     Difficulty of Paying Living Expenses: Not hard at all   Food Insecurity: No Food Insecurity (3/5/2024)    Hunger Vital Sign     Worried About Running Out of Food in the Last Year: Never true     Ran Out of Food in the Last Year: Never true   Transportation Needs: No Transportation Needs (3/5/2024)    PRAPARE - Transportation     Lack of Transportation (Medical): No     Lack of Transportation (Non-Medical): No   Physical Activity: Inactive (3/5/2024)    Exercise Vital Sign     Days of Exercise per Week: 0 days     Minutes of Exercise per Session: 0 min   Stress: Stress Concern Present (3/5/2024)    [employer] - Occupational Stress Questionnaire     Feeling of Stress : To some extent   Housing Stability: Low Risk  (3/5/2024)    Housing Stability Vital Sign     Unable to Pay for Housing in the Last Year: No     Number of Places Lived in the Last Year: 1     Unstable Housing in the Last Year: No

## 2025-03-05 NOTE — PROGRESS NOTES
Patient ID: Dang Blandon  YOB: 2005  MRN: [id number]    Chief Complaint: No chief complaint on file.      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    ***  History of Present Illness            Recall HPI 2025  Dang presents with right knee pain that started in 2025. Pain is located in the anterior aspect of the knee, just below the patella, exacerbated by standing, walking, and pressure. She works as a pharmacy technician, requiring frequent standing. Pain wakes her up at night and persists even after sitting or lying down following prolonged standing. She rates her improvement as 0% since symptom onset. She has undergone various treatments since September, initially seen by Dr. Hernandez. Treatments include injections, physical therapy, and using a J brace, all reported as ineffective. She was initially prescribed Celebrex, later switched to Diclofenac by a new doctor, but reports no difference with the medication change. She mentions a history of knee trouble during her younger years when she participated in cheer and tumble, though no specific injury is noted. Dang has undergone an MRI, but the quality was reportedly not optimal for diagnosis. Dang denies any catching, locking, or getting stuck of the knee. Injections: Dang received injections. Physical therapy: Dang attended sessions 2-3 times per week for approximately 2 months. Exercises included leg lifts, squats, stairs, steps, and use of a machine.             Past Medical History:   Past Medical History:   Diagnosis Date    Asthma 2021 2:42:33 PM    [facility] Historical - Respiratory: Asthma-No Additional Notes    Asthma 2021 2:42:33 PM    [facility] Historical - Respiratory: Asthma-No Additional Notes    Non- jaundice 2021 2:42:30 PM    [facility]  [place] Historical - Quick Add: Jaundice-No Additional Notes    Non- jaundice 2021 2:42:30 PM    [facility] Historical - Quick Add: Jaundice-No Additional Notes     Past Surgical History:   Procedure Laterality Date    TONSILLECTOMY       No family history on file.  Social History[1]  Medication List with Changes/Refills   Current Medications    BUSPIRONE (BUSPAR) 10 MG TABLET    Take 1 tablet (10 mg total) by mouth 3 (three) times daily as needed (anxiety).    DICLOFENAC (VOLTAREN) 75 MG EC TABLET    Take 1 tablet (75 mg total) by mouth 2 (two) times daily as needed.    NORETHINDRONE-E.ESTRADIOL-IRON (LO LOESTRIN FE) 1 MG-10 MCG (24)/10 MCG (2) TAB    Take 1 tablet by mouth once daily.    SERTRALINE (ZOLOFT) 100 MG TABLET    1.5 tablets daily by mouth     Review of patient's allergies indicates:  No Known Allergies  ROS    Physical Exam:   There is no height or weight on file to calculate BMI.  There were no vitals filed for this visit.   GENERAL: Well appearing, appropriate for stated age, no acute distress.  CARDIOVASCULAR: Pulses regular by peripheral palpation.  PULMONARY: Respirations are even and non-labored.  NEURO: Awake, alert, and oriented x 3.  PSYCH: Mood & affect are appropriate.  HEENT: Head is normocephalic and atraumatic.  Ortho/SPM Exam    Physical Exam                  Imaging:    CT Knee Without Contrast Left  Narrative: EXAMINATION:  CT KNEE WITHOUT CONTRAST LEFT    CLINICAL HISTORY:  Knee pain, chronic, positive xray (Age >= 5y);  Pain in left knee    TECHNIQUE:  The left knee was scanned without intravenous contrast.  Multiplanar reformats are reviewed.    COMPARISON:  Left knee MRI, 2025.    FINDINGS:  There is no fracture or dislocation of the left knee.  The patellofemoral compartment has a normal appearance and alignment.  No cortical erosion or osseous destruction.  No sclerotic lesion.  No joint effusion.  No muscle atrophy.  No soft tissue mass.  Impression: Normal  left knee CT.    Electronically signed by: Ezequiel Osorio MD  Date:    02/28/2025  Time:    13:27  MRI Knee Without Contrast Left  Narrative: EXAM:  MRI KNEE WITHOUT CONTRAST LEFT    CLINICAL INDICATION: Pain in left knee.    TECHNIQUE: MR left knee performed with multiplanar multisequence imaging.    COMPARISON STUDY:   Left knee x-ray 02/20/2025, MR left knee 12/03/2024 ([facility]).    FINDINGS: No change.    There is no internal derangement with preservation of signal intensity and morphology of the menisci, collateral ligaments, and cruciate ligaments.    There is a fluid-filled joint recess versus a thinly septated ganglion-like cyst anterior intercondylar notch, overlying the anterior root attachment lateral meniscus, 1 x 0.4 x 0.9 cm.  Negative for meniscal tear.    Extensor mechanism is intact.  There is no knee joint effusion.  Mild lateral patellar tilting without subluxation.  Normal TT TG distance, 8 mm.    The chondral surfaces are well preserved.    The bone marrow signal intensity is normal.    There is no soft tissue swelling.  Impression:  No change compared with December 2024.  Fluid-filled joint recess versus ganglion-like cyst, anterior condyle notch overlying anterior root attachment lateral meniscus, 1 cm.  Negative for meniscal tear.  Mild lateral patellar tilting.  Otherwise, unremarkable MR left knee.    Finalized on: 2/28/2025 8:14 AM By:  Darren Merino MD  [facility]# [id number]      2025-02-28 08:16:07.9     [facility]        Relevant imaging results reviewed and interpreted by me, discussed with the patient and / or family today. ***    Other Tests:     ***    Assessment & Plan                  There are no Patient Instructions on file for this visit.    Provider Note/Medical Decision Making: ***      I discussed worrisome and red flag signs and symptoms with the patient. The patient expressed understanding and agreed to alert me immediately or to go to the emergency room if they experience any of these.    Treatment plan was developed with input from the patient/family, and they expressed understanding and agreement with the plan. All questions were answered today.          Yovanny Matthews MD  Orthopaedic Surgery & Sports Medicine       Disclaimer: This note was prepared using a voice recognition system and is likely to have sound alike errors within the text.     This note was generated with the assistance of ambient listening technology. Verbal consent was obtained by the patient and accompanying visitor(s) for the recording of patient appointment to facilitate this note. I attest to having reviewed and edited the generated note for accuracy, though some syntax or spelling errors may persist. Please contact the author of this note for any clarification.           [1]   Social History  Socioeconomic History    Marital status: Single   Tobacco Use    Smoking status: Never     Passive exposure: Never    Smokeless tobacco: Never   Substance and Sexual Activity    Alcohol use: Never    Drug use: Never    Sexual activity: Not Currently     Partners: Male     Social Drivers of Health     Financial Resource Strain: Low Risk  (3/5/2024)    Overall Financial Resource Strain (CARDIA)     Difficulty of Paying Living Expenses: Not hard at all   Food Insecurity: No Food Insecurity (3/5/2024)    Hunger Vital Sign     Worried About Running Out of Food in the Last Year: Never true     Ran Out of Food in the Last Year: Never true   Transportation Needs: No Transportation Needs (3/5/2024)    PRAPARE - Transportation     Lack of Transportation (Medical): No     Lack of Transportation (Non-Medical): No   Physical Activity: Inactive (3/5/2024)    Exercise Vital Sign     Days of Exercise per Week: 0 days     Minutes of Exercise per Session: 0 min   Stress: Stress Concern Present (3/5/2024)    [employer] - Occupational Stress Questionnaire     Feeling of Stress : To some extent   Housing Stability: Low  Risk  (3/5/2024)    Housing Stability Vital Sign     Unable to Pay for Housing in the Last Year: No     Number of Places Lived in the Last Year: 1     Unstable Housing in the Last Year: No

## 2025-03-07 ENCOUNTER — LAB VISIT (OUTPATIENT)
Dept: LAB | Facility: HOSPITAL | Age: 20
End: 2025-03-07
Attending: ORTHOPAEDIC SURGERY
Payer: COMMERCIAL

## 2025-03-07 DIAGNOSIS — G89.29 CHRONIC PATELLOFEMORAL PAIN OF LEFT KNEE: ICD-10-CM

## 2025-03-07 DIAGNOSIS — M25.562 CHRONIC PATELLOFEMORAL PAIN OF LEFT KNEE: ICD-10-CM

## 2025-03-07 LAB
ANION GAP SERPL CALC-SCNC: 10 MMOL/L (ref 8–16)
APTT PPP: 28.7 SEC (ref 21–32)
BUN SERPL-MCNC: 12 MG/DL (ref 6–20)
CALCIUM SERPL-MCNC: 9.1 MG/DL (ref 8.7–10.5)
CHLORIDE SERPL-SCNC: 106 MMOL/L (ref 95–110)
CO2 SERPL-SCNC: 25 MMOL/L (ref 23–29)
CREAT SERPL-MCNC: 0.8 MG/DL (ref 0.5–1.4)
EST. GFR  (NO RACE VARIABLE): >60 ML/MIN/1.73 M^2
GLUCOSE SERPL-MCNC: 80 MG/DL (ref 70–110)
INR PPP: 1 (ref 0.8–1.2)
POTASSIUM SERPL-SCNC: 3.8 MMOL/L (ref 3.5–5.1)
PROTHROMBIN TIME: 10.9 SEC (ref 9–12.5)
SODIUM SERPL-SCNC: 141 MMOL/L (ref 136–145)

## 2025-03-07 PROCEDURE — 80048 BASIC METABOLIC PNL TOTAL CA: CPT | Performed by: ORTHOPAEDIC SURGERY

## 2025-03-07 PROCEDURE — 85610 PROTHROMBIN TIME: CPT | Performed by: ORTHOPAEDIC SURGERY

## 2025-03-07 PROCEDURE — 85730 THROMBOPLASTIN TIME PARTIAL: CPT | Performed by: ORTHOPAEDIC SURGERY

## 2025-03-07 PROCEDURE — 36415 COLL VENOUS BLD VENIPUNCTURE: CPT | Mod: PO | Performed by: ORTHOPAEDIC SURGERY

## 2025-03-07 PROCEDURE — 85025 COMPLETE CBC W/AUTO DIFF WBC: CPT | Performed by: ORTHOPAEDIC SURGERY

## 2025-03-08 LAB
BASOPHILS # BLD AUTO: 0.02 K/UL (ref 0–0.2)
BASOPHILS NFR BLD: 0.3 % (ref 0–1.9)
DIFFERENTIAL METHOD BLD: ABNORMAL
EOSINOPHIL # BLD AUTO: 0 K/UL (ref 0–0.5)
EOSINOPHIL NFR BLD: 0.4 % (ref 0–8)
ERYTHROCYTE [DISTWIDTH] IN BLOOD BY AUTOMATED COUNT: 12.5 % (ref 11.5–14.5)
HCT VFR BLD AUTO: 41.7 % (ref 37–48.5)
HGB BLD-MCNC: 13 G/DL (ref 12–16)
IMM GRANULOCYTES # BLD AUTO: 0.02 K/UL (ref 0–0.04)
IMM GRANULOCYTES NFR BLD AUTO: 0.3 % (ref 0–0.5)
LYMPHOCYTES # BLD AUTO: 2 K/UL (ref 1–4.8)
LYMPHOCYTES NFR BLD: 29.2 % (ref 18–48)
MCH RBC QN AUTO: 27.8 PG (ref 27–31)
MCHC RBC AUTO-ENTMCNC: 31.2 G/DL (ref 32–36)
MCV RBC AUTO: 89 FL (ref 82–98)
MONOCYTES # BLD AUTO: 0.3 K/UL (ref 0.3–1)
MONOCYTES NFR BLD: 4.8 % (ref 4–15)
NEUTROPHILS # BLD AUTO: 4.5 K/UL (ref 1.8–7.7)
NEUTROPHILS NFR BLD: 65 % (ref 38–73)
NRBC BLD-RTO: 0 /100 WBC
PLATELET # BLD AUTO: 291 K/UL (ref 150–450)
PMV BLD AUTO: 10.4 FL (ref 9.2–12.9)
RBC # BLD AUTO: 4.68 M/UL (ref 4–5.4)
WBC # BLD AUTO: 6.85 K/UL (ref 3.9–12.7)

## 2025-03-12 ENCOUNTER — OFFICE VISIT (OUTPATIENT)
Dept: SPORTS MEDICINE | Facility: CLINIC | Age: 20
End: 2025-03-12
Payer: COMMERCIAL

## 2025-03-12 ENCOUNTER — PATIENT MESSAGE (OUTPATIENT)
Dept: SPORTS MEDICINE | Facility: CLINIC | Age: 20
End: 2025-03-12
Payer: COMMERCIAL

## 2025-03-12 ENCOUNTER — PATIENT MESSAGE (OUTPATIENT)
Dept: PREADMISSION TESTING | Facility: HOSPITAL | Age: 20
End: 2025-03-12
Payer: COMMERCIAL

## 2025-03-12 VITALS — BODY MASS INDEX: 24.18 KG/M2 | HEIGHT: 62 IN | WEIGHT: 131.38 LBS

## 2025-03-12 DIAGNOSIS — T14.8XXA BONE BRUISE: ICD-10-CM

## 2025-03-12 DIAGNOSIS — G89.29 CHRONIC PATELLOFEMORAL PAIN OF LEFT KNEE: Primary | ICD-10-CM

## 2025-03-12 DIAGNOSIS — M25.562 CHRONIC PATELLOFEMORAL PAIN OF LEFT KNEE: Primary | ICD-10-CM

## 2025-03-12 DIAGNOSIS — M23.042 CYST OF ANTERIOR HORN OF LATERAL MENISCUS OF LEFT KNEE: ICD-10-CM

## 2025-03-12 DIAGNOSIS — M84.469A INSUFFICIENCY FRACTURE OF TIBIA, INITIAL ENCOUNTER: ICD-10-CM

## 2025-03-12 PROCEDURE — 97110 THERAPEUTIC EXERCISES: CPT | Mod: S$GLB,,, | Performed by: PHYSICIAN ASSISTANT

## 2025-03-12 PROCEDURE — 99214 OFFICE O/P EST MOD 30 MIN: CPT | Mod: S$GLB,,, | Performed by: PHYSICIAN ASSISTANT

## 2025-03-12 PROCEDURE — 3008F BODY MASS INDEX DOCD: CPT | Mod: CPTII,S$GLB,, | Performed by: PHYSICIAN ASSISTANT

## 2025-03-12 PROCEDURE — 99999 PR PBB SHADOW E&M-EST. PATIENT-LVL III: CPT | Mod: PBBFAC,,, | Performed by: PHYSICIAN ASSISTANT

## 2025-03-14 ENCOUNTER — ANESTHESIA EVENT (OUTPATIENT)
Dept: SURGERY | Facility: HOSPITAL | Age: 20
End: 2025-03-14
Payer: COMMERCIAL

## 2025-03-14 NOTE — ANESTHESIA PREPROCEDURE EVALUATION
03/14/2025  Dang Dia Head is a 19 y.o., female.      Pre-op Assessment    I have reviewed the Patient Summary Reports.     I have reviewed the Nursing Notes.    I have reviewed the Medications.     Review of Systems  Anesthesia Hx:  No problems with previous Anesthesia  GA as child without problems. History of prior surgery of interest to airway management or planning:  Previous anesthesia: General        Denies Family Hx of Anesthesia complications.    Denies Personal Hx of Anesthesia complications.                    Pulmonary:    Asthma asymptomatic      Asthma:                   Physical Exam  General: Alert and Oriented    Airway:  Mallampati: II   Mouth Opening: Normal  TM Distance: Normal  Tongue: Normal  Neck ROM: Normal ROM    Dental:  Intact    Chest/Lungs:  Clear to auscultation, Normal Respiratory Rate    Heart:  Rate: Normal  Rhythm: Regular Rhythm        Anesthesia Plan  Type of Anesthesia, risks & benefits discussed:    Anesthesia Type: Gen Supraglottic Airway  Intra-op Monitoring Plan: Standard ASA Monitors  Post Op Pain Control Plan: multimodal analgesia, IV/PO Opioids PRN and peripheral nerve block  Induction:  IV  Informed Consent: Informed consent signed with the Patient and all parties understand the risks and agree with anesthesia plan.  All questions answered. Patient consented to blood products? No  ASA Score: 1  Day of Surgery Review of History & Physical: H&P Update referred to the surgeon/provider.    Ready For Surgery From Anesthesia Perspective.     .

## 2025-03-17 ENCOUNTER — PATIENT MESSAGE (OUTPATIENT)
Dept: RESPIRATORY THERAPY | Facility: HOSPITAL | Age: 20
End: 2025-03-17
Payer: COMMERCIAL

## 2025-03-17 NOTE — PROGRESS NOTES
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  Patient states that's she doesn't remember a specific injury but pain has been going on since 2024.  Denies any fevers, chills, night sweats, numbness and tingling.       Past Medical History:   Past Medical History:   Diagnosis Date    Asthma 2021 2:42:33 PM    [facility] - Respiratory: Asthma-No Additional Notes    Asthma 2021 2:42:33 PM    [facility] - Respiratory: Asthma-No Additional Notes    Non- jaundice 2021 2:42:30 PM    [facility] - Quick Add: Jaundice-No Additional Notes    Non- jaundice 2021 2:42:30 PM    [facility] - Quick Add: Jaundice-No Additional Notes     Past Surgical History:   Procedure Laterality Date    TONSILLECTOMY       No family history on file.  Social History[1]  Medication List with Changes/Refills   Current Medications    BUSPIRONE (BUSPAR) 10 MG TABLET    Take 1 tablet (10 mg total) by mouth 3 (three) times daily as needed (anxiety).    DICLOFENAC (VOLTAREN) 75 MG EC TABLET    Take 1 tablet (75 mg total) by mouth 2 (two) times daily as needed.    NORETHINDRONE-E.ESTRADIOL-IRON (LO LOESTRIN FE) 1 MG-10 MCG (24)/10 MCG (2) TAB    Take 1 tablet by mouth once daily.    SERTRALINE (ZOLOFT) 100 MG TABLET    1.5 tablets daily by mouth     Review of patient's allergies indicates:  No Known Allergies  ROS    Physical Exam:   Body mass index is 24.03 kg/m².  There were no vitals filed for this visit.   GENERAL: Well appearing,  appropriate for stated age, no acute distress.  CARDIOVASCULAR: Pulses regular by peripheral palpation.  PULMONARY: Respirations are even and non-labored.  NEURO: Awake, alert, and oriented x 3.  PSYCH: Mood & affect are appropriate.  HEENT: Head is normocephalic and atraumatic.    Ortho/SPM Exam  Left Knee Exam  Inspection: No effusion, erythema, or ecchymosis   No signs or symptoms of infection  Range of motion: 0 deg extension - 120 deg flexion  Strength:  5/5 Extension                   5/5 Flexion  All compartments are soft and compressible. Calf soft non-tender. Intact EHL, FHL, gastrocsoleus, and tibialis anterior. Sensation intact to light touch in superficial peroneal, deep peroneal, tibial, sural, and saphenous nerve distributions. Foot warm and well perfused with capillary refill of less than 2 seconds and palpable pedal pulses.     Imaging:   XR Results:  Results for orders placed during the hospital encounter of 02/20/25    X-ray Knee Ortho Left with Flexion (XPD)    Narrative  EXAMINATION:  XR KNEE ORTHO LEFT WITH FLEXION (XPD)    CLINICAL HISTORY:  . Pain in right knee    TECHNIQUE:  AP standing view of both knees, PA flexion standing views of both knees, and Merchant views of both knees were performed. A lateral view of the left knee was also performed.    COMPARISON:  None    FINDINGS:  No acute osseous or soft tissue abnormality.    Impression  As above      Electronically signed by: Justus Rod MD  Date:    02/20/2025  Time:    13:06      Results for orders placed or performed during the hospital encounter of 02/28/25 (from the past 2160 hours)   MRI Knee Without Contrast Left    Impression     No change compared with December 2024.  Fluid-filled joint recess versus ganglion-like cyst, anterior condyle notch overlying anterior root attachment lateral meniscus, 1 cm.  Negative for meniscal tear.  Mild lateral patellar tilting.  Otherwise, unremarkable MR left knee.    Finalized on: 2/28/2025  8:14 AM By:  Darren Merino MD  [facility]# [id number]      2025-02-28 08:16:[id number]     [facility]         Relevant imaging results reviewed and interpreted by me, discussed with the patient and / or family today.    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    Chronic patellofemoral pain of left knee Yes    Insufficiency fracture of tibia, initial encounter     Bone bruise     Cyst of anterior horn of lateral meniscus of left knee       Plan:  PT/OT:   Scheduled for [facility] in [place] on 3/20 @ 3:45  Medications:    Discussed all post-op medications in detail  Avoid all oral anti-inflammatories    Education:    All pre-op and post-op material   Return to clinic:    For post op appointments  2 week: 3/31  6 week: 4/30     Follow-up: 2 & 6 week post-op. Please reach out to us sooner if there are any problems between now and then.    About Dr. Cassie Matthews's Research & Publications    Give us Feedback:   Google: Leave Google Review  Healthgrades: Leave Healthgrades Review     Provider Note/Medical Decision Making:   Preoperative labs reviewed today.  CBC - are within acceptable ranges for age  BNP - are within acceptable ranges for age  PT/PTT/INR - are within acceptable ranges for age    Surgery Instructions:  At least 15 minutes were spent developing, teaching, and performing a home exercise program.  A written summary was provided and all questions were answered. CPT 25678-SQ      Discontinue any NSAIDs/aspirin 7 days prior to surgery.  Discontinue all herbal supplements 7 days prior to surgery.  Do not eat or drink anything the after  midnight the day before surgery unless instructed otherwise.  You maybe prescribed pain medication following surgery please follow all instructions on the label.   The day before your surgery (usually between 1-3 PM), someone will call you to give you the scheduled time for you surgery, what time you need to arrive, what time to not eat/drink past, and any other final instructions  If your surgery is scheduled for Monday then they will call you on Friday afternoon.   If you do not receive this call please reach out to our office before the end of the day (4 PM) so that we may assist you.  We discussed operative and non-operative options. We discussed the risks of surgery at length, including but not limited to pain, infection, bleeding, damage to adjacent structures such as nerves and blood vessels, failure to heal, stiffness, laxity, need for more surgery, stroke, blood clot, loss of life, loss of limb, need for removal of any implants used, anesthesia risks, breathing problems, and heart problems. They expressed understanding of the risks an opted to proceed with surgical management.  I discussed worrisome and red flag signs and symptoms with the patient. The patient expressed understanding and agreed to alert me immediately or to go to the emergency room if they experience any of these.   Treatment plan was developed with input from the patient/family, and they expressed understanding and agreement with the plan. All questions were answered today.      Knee Surgery Post-Operative Instructions     Yovanny Matthews MD   [address] [place] [id number]  Ph: [phone] Fax: [phone]    After you get home, apply ice to your knee but keep the bandages dry. You may apply ice?for 15-20 minutes every 1-2 hours for first week. Ice helps to reduce pain and?swelling. Never apply ice directly to the skin. If you are using a CryoCuff/PolarIce, it should be ice cold for no more than 15-20 minutes every 1-2  hours.     Elevate your leg on 2-3 pillows or rolled up towels placed under the heel so that the heel?is elevated higher than your knee. This will help reduce swelling and achieve full?extension of the knee.     It is important to get up and move around after your surgery. It's good for your lungs after anesthesia, and also good for your circulation to help prevent blood clots from developing.  However, too much walking will cause the knee to swell and hurt.     After 72 hours, you can remove the ACE wrap and bandages. You should then place new gauze/bandages and ACE wrap each day for 2 weeks.     You may shower, but the incisions, ACE bandages, and Brace must not get wet until 72 hours after surgery and only if there is no drainage at all from the incisions. Do not soak the knee under water for 2 weeks.     Weight-Bearing Status: You are to be (weight bearing/ non-weight bearing) on your operative leg.?     Range of motion:_________________________    Take the pain medicine as needed. You may take up to 2 tablets every 4-6 hours if?needed. As the pain subsides try to increase the time between doses.      Your first post-operative check-up is 10-14 days from the?day of surgery. _________________________       It is normal to have some discomfort and swelling, as well as a small amount of blood-tinged drainage, following surgery. If this becomes severe, or if you develop a fever greater than or equal to?101 degrees, calf pain, or shortness of breath or chest pain, please call immediately. If?you have questions or problems, call the office at [phone].     NORMAL SENSATIONS AND FINDINGS AFTER SURGERY   Shin pain   Knee swelling and warmth up to 2 weeks   Small amounts of bloody drainage   Numbness around the incision area   Soreness and swelling in the back of the knee   Bruising to the lower leg   Lower leg swelling, including the ankle - if this occurs elevate the leg above the heart?and apply ice to the swollen  areas.   Numbness to the foot if you had a nerve block - will resolve within a few days   Low grade temperature less than 101.5 - if this occurs drink plenty of fluids and cough?and deep breathe (take 10 breaths, on the last hold for a second then forcefully cough a?few times). A low grade temp is normal for a week after surgery   Small amount of redness to the area where the sutures insert in the skin  Low back discomfort due to the epidural / spinal anesthesia apply a heating pad as?needed      NOTIFY OUR OFFICE IMMEDIATELY AT [phone] IF ANY OF THE FOLLOWING SIGNS OR SYMPTOMS OCCUR:   Chest pain or shortness of breath   Change is noted to your incision (i.e. increased redness or drainage)   Numbness of your foot if you didn't have a nerve block   Sharp pains in the back of your hip, thigh, or calf   Temperature greater than 101.5 degrees   Fever, chills, nausea, vomiting or diarrhea   Stitches loosen or fall out and incisions open up   Thick, foul-smelling drainage (yellow or greenish)   Increased pain which is not relieved by medications or other measures mentioned above       Knee & Quad Exercise Instructions     Yovanny Matthews MD   [address] [place] [address]  Ph: [phone] Fax: [phone]       Exercises listed are to be performed by the patient following surgery. Perform sets of 10 repetitions, 4 times per day.        Heel Slides        Lie flat or sit with your leg straight. Slide your heel toward your hip. Try to get your knee bent to a 90° angle. Slide your heel back so your leg is straight then relax.       Knee Extension (Lying Down)      While lying down, rest your ankle on a towel roll so that your knee and calf are not touching the floor. Allow gravity to straighten your knee. Maintain this position for up to 10 minutes.       Knee Extension (Sitting in a Chair)      While sitting in a chair, prop your heel on another chair so that there is nothing behind your calf or  knee. Allow gravity to straighten your knee. Maintain this position for up to 10 minute       Patellar Mobilization      This exercise is done by simply pushing the patella up and down and side to side and holding that position. Movement of the patella is essential when restoring range of motion. If the patella cannot move within the femoral groove, then the knee cannot bend and extend.?         Quadriceps Isometrics (Quad Sets)        Lie flat or sit with your surgical leg straight. Tighten the muscle in the front of your thigh as much as you can, pushing the back or your knee flat against the floor. Hold this tight for 5 seconds then relax.        Straight Leg Raises (SLR)      Lie flat or sit with your leg straight and your knee brace on (if you have one). You may have your non-operative knee bent slightly for comfort. Perform a Quad set (as above) and flex your toes straight up. Lift your heel off of the floor and hold for at least 5 seconds. Keep your thigh muscle as tight as you can and lower your heel back down then relax.       Seated Knee Flexion        Sit with your legs dangling over the bed. Relax your leg allowing gravity to bend your knee. You may use your non-operative leg to gently push your operative leg into more of a bend. Maintain this position for up to 10 minutes.        Calf Pumps         Point and flex your toes to tighten your calf muscles.        Kourtney Peralta PA-C  Sports Medicine Physician Assistant       Disclaimer: This note was prepared using a voice recognition system and is likely to have sound alike errors within the text.          [1]   Social History  Socioeconomic History    Marital status: Single   Tobacco Use    Smoking status: Never     Passive exposure: Never    Smokeless tobacco: Never   Substance and Sexual Activity    Alcohol use: Never    Drug use: Never    Sexual activity: Not Currently     Partners: Male     Social Drivers of Health     Financial Resource Strain: Low  Risk  (3/10/2025)    Overall Financial Resource Strain (CARDIA)     Difficulty of Paying Living Expenses: Not very hard   Food Insecurity: No Food Insecurity (3/10/2025)    Hunger Vital Sign     Worried About Running Out of Food in the Last Year: Never true     Ran Out of Food in the Last Year: Never true   Transportation Needs: No Transportation Needs (3/10/2025)    PRAPARE - Transportation     Lack of Transportation (Medical): No     Lack of Transportation (Non-Medical): No   Physical Activity: Inactive (3/10/2025)    Exercise Vital Sign     Days of Exercise per Week: 0 days     Minutes of Exercise per Session: 0 min   Stress: No Stress Concern Present (3/10/2025)    [employer] [place] [employer] - Occupational Stress Questionnaire     Feeling of Stress : Only a little   Housing Stability: Low Risk  (3/10/2025)    Housing Stability Vital Sign     Unable to Pay for Housing in the Last Year: No     Number of Times Moved in the Last Year: 0     Homeless in the Last Year: No

## 2025-03-17 NOTE — H&P (VIEW-ONLY)
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  Patient states that's she doesn't remember a specific injury but pain has been going on since 2024.  Denies any fevers, chills, night sweats, numbness and tingling.       Past Medical History:   Past Medical History:   Diagnosis Date    Asthma 2021 2:42:33 PM    [facility] Historical - Respiratory: Asthma-No Additional Notes    Asthma 2021 2:42:33 PM    [facility] Historical - Respiratory: Asthma-No Additional Notes    Non- jaundice 2021 2:42:30 PM    [facility] Historical - Quick Add: Jaundice-No Additional Notes    Non- jaundice 2021 2:42:30 PM    [facility] - Quick Add: Jaundice-No Additional Notes     Past Surgical History:   Procedure Laterality Date    TONSILLECTOMY       No family history on file.  Social History[1]  Medication List with Changes/Refills   Current Medications    BUSPIRONE (BUSPAR) 10 MG TABLET    Take 1 tablet (10 mg total) by mouth 3 (three) times daily as needed (anxiety).    DICLOFENAC (VOLTAREN) 75 MG EC TABLET    Take 1 tablet (75 mg total) by mouth 2 (two) times daily as needed.    NORETHINDRONE-E.ESTRADIOL-IRON (LO LOESTRIN FE) 1 MG-10 MCG (24)/10 MCG (2) TAB    Take 1 tablet by mouth once daily.    SERTRALINE (ZOLOFT) 100 MG TABLET    1.5 tablets daily by mouth     Review of patient's allergies indicates:  No Known Allergies  ROS    Physical Exam:   Body mass index is 24.03 kg/m².  There were no vitals filed for this visit.   GENERAL: Well appearing,  appropriate for stated age, no acute distress.  CARDIOVASCULAR: Pulses regular by peripheral palpation.  PULMONARY: Respirations are even and non-labored.  NEURO: Awake, alert, and oriented x 3.  PSYCH: Mood & affect are appropriate.  HEENT: Head is normocephalic and atraumatic.    Ortho/SPM Exam  Left Knee Exam  Inspection: No effusion, erythema, or ecchymosis   No signs or symptoms of infection  Range of motion: 0 deg extension - 120 deg flexion  Strength:  5/5 Extension                   5/5 Flexion  All compartments are soft and compressible. Calf soft non-tender. Intact EHL, FHL, gastrocsoleus, and tibialis anterior. Sensation intact to light touch in superficial peroneal, deep peroneal, tibial, sural, and saphenous nerve distributions. Foot warm and well perfused with capillary refill of less than 2 seconds and palpable pedal pulses.     Imaging:   XR Results:  Results for orders placed during the hospital encounter of 02/20/25    X-ray Knee Ortho Left with Flexion (XPD)    Narrative  EXAMINATION:  XR KNEE ORTHO LEFT WITH FLEXION (XPD)    CLINICAL HISTORY:  . Pain in right knee    TECHNIQUE:  AP standing view of both knees, PA flexion standing views of both knees, and Merchant views of both knees were performed. A lateral view of the left knee was also performed.    COMPARISON:  None    FINDINGS:  No acute osseous or soft tissue abnormality.    Impression  As above      Electronically signed by: Justus Rod MD  Date:    02/20/2025  Time:    13:06      Results for orders placed or performed during the hospital encounter of 02/28/25 (from the past 2160 hours)   MRI Knee Without Contrast Left    Impression     No change compared with December 2024.  Fluid-filled joint recess versus ganglion-like cyst, anterior condyle notch overlying anterior root attachment lateral meniscus, 1 cm.  Negative for meniscal tear.  Mild lateral patellar tilting.  Otherwise, unremarkable MR left knee.    Finalized on: 2/28/2025  8:14 AM By:  Darren Merino MD  [facility]# [id number]      2025-02-28 08:16:[id number]     [facility]         Relevant imaging results reviewed and interpreted by me, discussed with the patient and / or family today.    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    Chronic patellofemoral pain of left knee Yes    Insufficiency fracture of tibia, initial encounter     Bone bruise     Cyst of anterior horn of lateral meniscus of left knee       Plan:  PT/OT:   Scheduled for [facility] [place] on 3/20 @ 3:45  Medications:    Discussed all post-op medications in detail  Avoid all oral anti-inflammatories    Education:    All pre-op and post-op material   Return to clinic:    For post op appointments  2 week: 3/31  6 week: 4/30     Follow-up: 2 & 6 week post-op. Please reach out to us sooner if there are any problems between now and then.    About Dr. Cassie Matthews's Research & Publications    Give us Feedback:   Google: Leave Google Review  Healthgrades: Leave Healthgrades Review     Provider Note/Medical Decision Making:   Preoperative labs reviewed today.  CBC - are within acceptable ranges for age  BNP - are within acceptable ranges for age  PT/PTT/INR - are within acceptable ranges for age    Surgery Instructions:  At least 15 minutes were spent developing, teaching, and performing a home exercise program.  A written summary was provided and all questions were answered. CPT 23121-VK      Discontinue any NSAIDs/aspirin 7 days prior to surgery.  Discontinue all herbal supplements 7 days prior to surgery.  Do not eat or drink anything the after  midnight the day before surgery unless instructed otherwise.  You maybe prescribed pain medication following surgery please follow all instructions on the label.   The day before your surgery (usually between 1-3 PM), someone will call you to give you the scheduled time for you surgery, what time you need to arrive, what time to not eat/drink past, and any other final instructions  If your surgery is scheduled for Monday then they will call you on Friday afternoon.   If you do not receive this call please reach out to our office before the end of the day (4 PM) so that we may assist you.  We discussed operative and non-operative options. We discussed the risks of surgery at length, including but not limited to pain, infection, bleeding, damage to adjacent structures such as nerves and blood vessels, failure to heal, stiffness, laxity, need for more surgery, stroke, blood clot, loss of life, loss of limb, need for removal of any implants used, anesthesia risks, breathing problems, and heart problems. They expressed understanding of the risks an opted to proceed with surgical management.  I discussed worrisome and red flag signs and symptoms with the patient. The patient expressed understanding and agreed to alert me immediately or to go to the emergency room if they experience any of these.   Treatment plan was developed with input from the patient/family, and they expressed understanding and agreement with the plan. All questions were answered today.      Knee Surgery Post-Operative Instructions     Yovanny aMtthews MD   [address] [place] [id number]  Ph: [phone] Fax: [phone]    After you get home, apply ice to your knee but keep the bandages dry. You may apply ice?for 15-20 minutes every 1-2 hours for first week. Ice helps to reduce pain and?swelling. Never apply ice directly to the skin. If you are using a CryoCuff/PolarIce, it should be ice cold for no more than 15-20 minutes every 1-2  hours.     Elevate your leg on 2-3 pillows or rolled up towels placed under the heel so that the heel?is elevated higher than your knee. This will help reduce swelling and achieve full?extension of the knee.     It is important to get up and move around after your surgery. It's good for your lungs after anesthesia, and also good for your circulation to help prevent blood clots from developing.  However, too much walking will cause the knee to swell and hurt.     After 72 hours, you can remove the ACE wrap and bandages. You should then place new gauze/bandages and ACE wrap each day for 2 weeks.     You may shower, but the incisions, ACE bandages, and Brace must not get wet until 72 hours after surgery and only if there is no drainage at all from the incisions. Do not soak the knee under water for 2 weeks.     Weight-Bearing Status: You are to be (weight bearing/ non-weight bearing) on your operative leg.?     Range of motion:_________________________    Take the pain medicine as needed. You may take up to 2 tablets every 4-6 hours if?needed. As the pain subsides try to increase the time between doses.      Your first post-operative check-up is 10-14 days from the?day of surgery. _________________________       It is normal to have some discomfort and swelling, as well as a small amount of blood-tinged drainage, following surgery. If this becomes severe, or if you develop a fever greater than or equal to?101 degrees, calf pain, or shortness of breath or chest pain, please call immediately. If?you have questions or problems, call the office at [phone].     NORMAL SENSATIONS AND FINDINGS AFTER SURGERY   Shin pain   Knee swelling and warmth up to 2 weeks   Small amounts of bloody drainage   Numbness around the incision area   Soreness and swelling in the back of the knee   Bruising to the lower leg   Lower leg swelling, including the ankle - if this occurs elevate the leg above the heart?and apply ice to the swollen  areas.   Numbness to the foot if you had a nerve block - will resolve within a few days   Low grade temperature less than 101.5 - if this occurs drink plenty of fluids and cough?and deep breathe (take 10 breaths, on the last hold for a second then forcefully cough a?few times). A low grade temp is normal for a week after surgery   Small amount of redness to the area where the sutures insert in the skin  Low back discomfort due to the epidural / spinal anesthesia apply a heating pad as?needed      NOTIFY OUR OFFICE IMMEDIATELY AT [phone] IF ANY OF THE FOLLOWING SIGNS OR SYMPTOMS OCCUR:   Chest pain or shortness of breath   Change is noted to your incision (i.e. increased redness or drainage)   Numbness of your foot if you didn't have a nerve block   Sharp pains in the back of your hip, thigh, or calf   Temperature greater than 101.5 degrees   Fever, chills, nausea, vomiting or diarrhea   Stitches loosen or fall out and incisions open up   Thick, foul-smelling drainage (yellow or greenish)   Increased pain which is not relieved by medications or other measures mentioned above       Knee & Quad Exercise Instructions     Yovanny Matthews MD   [address] [place] [address]  Ph: [phone] Fax: [phone]       Exercises listed are to be performed by the patient following surgery. Perform sets of 10 repetitions, 4 times per day.        Heel Slides        Lie flat or sit with your leg straight. Slide your heel toward your hip. Try to get your knee bent to a 90° angle. Slide your heel back so your leg is straight then relax.       Knee Extension (Lying Down)      While lying down, rest your ankle on a towel roll so that your knee and calf are not touching the floor. Allow gravity to straighten your knee. Maintain this position for up to 10 minutes.       Knee Extension (Sitting in a Chair)      While sitting in a chair, prop your heel on another chair so that there is nothing behind your calf or  knee. Allow gravity to straighten your knee. Maintain this position for up to 10 minute       Patellar Mobilization      This exercise is done by simply pushing the patella up and down and side to side and holding that position. Movement of the patella is essential when restoring range of motion. If the patella cannot move within the femoral groove, then the knee cannot bend and extend.?         Quadriceps Isometrics (Quad Sets)        Lie flat or sit with your surgical leg straight. Tighten the muscle in the front of your thigh as much as you can, pushing the back or your knee flat against the floor. Hold this tight for 5 seconds then relax.        Straight Leg Raises (SLR)      Lie flat or sit with your leg straight and your knee brace on (if you have one). You may have your non-operative knee bent slightly for comfort. Perform a Quad set (as above) and flex your toes straight up. Lift your heel off of the floor and hold for at least 5 seconds. Keep your thigh muscle as tight as you can and lower your heel back down then relax.       Seated Knee Flexion        Sit with your legs dangling over the bed. Relax your leg allowing gravity to bend your knee. You may use your non-operative leg to gently push your operative leg into more of a bend. Maintain this position for up to 10 minutes.        Calf Pumps         Point and flex your toes to tighten your calf muscles.        Kourtney Peralta PA-C  Sports Medicine Physician Assistant       Disclaimer: This note was prepared using a voice recognition system and is likely to have sound alike errors within the text.          [1]   Social History  Socioeconomic History    Marital status: Single   Tobacco Use    Smoking status: Never     Passive exposure: Never    Smokeless tobacco: Never   Substance and Sexual Activity    Alcohol use: Never    Drug use: Never    Sexual activity: Not Currently     Partners: Male     Social Drivers of Health     Financial Resource Strain: Low  Risk  (3/10/2025)    Overall Financial Resource Strain (CARDIA)     Difficulty of Paying Living Expenses: Not very hard   Food Insecurity: No Food Insecurity (3/10/2025)    Hunger Vital Sign     Worried About Running Out of Food in the Last Year: Never true     Ran Out of Food in the Last Year: Never true   Transportation Needs: No Transportation Needs (3/10/2025)    PRAPARE - Transportation     Lack of Transportation (Medical): No     Lack of Transportation (Non-Medical): No   Physical Activity: Inactive (3/10/2025)    Exercise Vital Sign     Days of Exercise per Week: 0 days     Minutes of Exercise per Session: 0 min   Stress: No Stress Concern Present (3/10/2025)    [employer] - Occupational Stress Questionnaire     Feeling of Stress : Only a little   Housing Stability: Low Risk  (3/10/2025)    Housing Stability Vital Sign     Unable to Pay for Housing in the Last Year: No     Number of Times Moved in the Last Year: 0     Homeless in the Last Year: No

## 2025-03-17 NOTE — PATIENT INSTRUCTIONS
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    Chronic patellofemoral pain of left knee Yes    Insufficiency fracture of tibia, initial encounter     Bone bruise     Cyst of anterior horn of lateral meniscus of left knee       Plan:  PT/OT:   Scheduled for [facility] on 3/20 @ 3:45  Medications:    Discussed all post-op medications in detail  Avoid all oral anti-inflammatories    Education:    All pre-op and post-op material   Return to clinic:    For post op appointments  2 week: 3/31  6 week: 4/30     Follow-up: 2 & 6 week post-op. Please reach out to us sooner if there are any problems between now and then.    About Dr. Cassie Matthews's Research & Publications    Give us Feedback:   Google: Leave Google Review  Healthgrades: Leave Healthgrades Review

## 2025-03-18 ENCOUNTER — HOSPITAL ENCOUNTER (OUTPATIENT)
Dept: RADIOLOGY | Facility: HOSPITAL | Age: 20
Discharge: HOME | End: 2025-03-18
Attending: ORTHOPAEDIC SURGERY | Admitting: ORTHOPAEDIC SURGERY
Payer: COMMERCIAL

## 2025-03-18 ENCOUNTER — HOSPITAL ENCOUNTER (OUTPATIENT)
Facility: HOSPITAL | Age: 20
Discharge: HOME | End: 2025-03-18
Attending: ORTHOPAEDIC SURGERY | Admitting: ORTHOPAEDIC SURGERY
Payer: COMMERCIAL

## 2025-03-18 ENCOUNTER — ANESTHESIA (OUTPATIENT)
Dept: SURGERY | Facility: HOSPITAL | Age: 20
End: 2025-03-18
Payer: COMMERCIAL

## 2025-03-18 VITALS
WEIGHT: 127.88 LBS | TEMPERATURE: 98 F | OXYGEN SATURATION: 100 % | SYSTOLIC BLOOD PRESSURE: 107 MMHG | HEIGHT: 62 IN | HEART RATE: 72 BPM | RESPIRATION RATE: 16 BRPM | BODY MASS INDEX: 23.53 KG/M2 | DIASTOLIC BLOOD PRESSURE: 63 MMHG

## 2025-03-18 DIAGNOSIS — G89.29 CHRONIC PATELLOFEMORAL PAIN OF LEFT KNEE: ICD-10-CM

## 2025-03-18 DIAGNOSIS — M25.562 CHRONIC PATELLOFEMORAL PAIN OF LEFT KNEE: ICD-10-CM

## 2025-03-18 LAB
B-HCG UR QL: NEGATIVE
CTP QC/QA: YES

## 2025-03-18 PROCEDURE — 63600175 PHARM REV CODE 636 W HCPCS: Performed by: ORTHOPAEDIC SURGERY

## 2025-03-18 PROCEDURE — 64447 NJX AA&/STRD FEMORAL NRV IMG: CPT | Performed by: ANESTHESIOLOGY

## 2025-03-18 PROCEDURE — 63600175 PHARM REV CODE 636 W HCPCS: Performed by: PHYSICIAN ASSISTANT

## 2025-03-18 PROCEDURE — 81025 URINE PREGNANCY TEST: CPT | Performed by: ORTHOPAEDIC SURGERY

## 2025-03-18 PROCEDURE — C1713 ANCHOR/SCREW BN/BN,TIS/BN: HCPCS | Performed by: ORTHOPAEDIC SURGERY

## 2025-03-18 PROCEDURE — 63600175 PHARM REV CODE 636 W HCPCS: Performed by: NURSE ANESTHETIST, CERTIFIED REGISTERED

## 2025-03-18 PROCEDURE — 88304 TISSUE EXAM BY PATHOLOGIST: CPT | Performed by: PATHOLOGY

## 2025-03-18 PROCEDURE — 63600175 PHARM REV CODE 636 W HCPCS: Performed by: ANESTHESIOLOGY

## 2025-03-18 PROCEDURE — 29855 TIBIAL ARTHROSCOPY/SURGERY: CPT | Mod: AS,LT,, | Performed by: PHYSICIAN ASSISTANT

## 2025-03-18 PROCEDURE — 71000033 HC RECOVERY, INTIAL HOUR: Performed by: ORTHOPAEDIC SURGERY

## 2025-03-18 PROCEDURE — 37000009 HC ANESTHESIA EA ADD 15 MINS: Performed by: ORTHOPAEDIC SURGERY

## 2025-03-18 PROCEDURE — 29855 TIBIAL ARTHROSCOPY/SURGERY: CPT | Mod: LT,,, | Performed by: ORTHOPAEDIC SURGERY

## 2025-03-18 PROCEDURE — 29882 ARTHRS KNE SRG MNISC RPR M/L: CPT | Mod: 51,LT,, | Performed by: ORTHOPAEDIC SURGERY

## 2025-03-18 PROCEDURE — 37000008 HC ANESTHESIA 1ST 15 MINUTES: Performed by: ORTHOPAEDIC SURGERY

## 2025-03-18 PROCEDURE — 64450 NJX AA&/STRD OTHER PN/BRANCH: CPT | Performed by: ORTHOPAEDIC SURGERY

## 2025-03-18 PROCEDURE — 36000711: Performed by: ORTHOPAEDIC SURGERY

## 2025-03-18 PROCEDURE — 27200703 HC ULTRASOUND NDL GUIDE: Performed by: ANESTHESIOLOGY

## 2025-03-18 PROCEDURE — 27201423 OPTIME MED/SURG SUP & DEVICES STERILE SUPPLY: Performed by: ORTHOPAEDIC SURGERY

## 2025-03-18 PROCEDURE — 27200750 HC INSULATED NEEDLE/ STIMUPLEX: Performed by: ANESTHESIOLOGY

## 2025-03-18 PROCEDURE — 25000003 PHARM REV CODE 250: Performed by: ANESTHESIOLOGY

## 2025-03-18 PROCEDURE — 71000015 HC POSTOP RECOV 1ST HR: Performed by: ORTHOPAEDIC SURGERY

## 2025-03-18 PROCEDURE — 73560 X-RAY EXAM OF KNEE 1 OR 2: CPT | Mod: TC,LT

## 2025-03-18 PROCEDURE — 25000003 PHARM REV CODE 250: Performed by: NURSE ANESTHETIST, CERTIFIED REGISTERED

## 2025-03-18 PROCEDURE — 36000710: Performed by: ORTHOPAEDIC SURGERY

## 2025-03-18 RX ORDER — DIPHENHYDRAMINE HYDROCHLORIDE 50 MG/ML
25 INJECTION, SOLUTION INTRAMUSCULAR; INTRAVENOUS EVERY 6 HOURS PRN
Status: DISCONTINUED | OUTPATIENT
Start: 2025-03-18 | End: 2025-03-18 | Stop reason: HOSPADM

## 2025-03-18 RX ORDER — ROPIVACAINE HYDROCHLORIDE 5 MG/ML
INJECTION, SOLUTION EPIDURAL; INFILTRATION; PERINEURAL
Status: COMPLETED | OUTPATIENT
Start: 2025-03-18 | End: 2025-03-18

## 2025-03-18 RX ORDER — ACETAMINOPHEN 10 MG/ML
INJECTION, SOLUTION INTRAVENOUS
Status: DISCONTINUED | OUTPATIENT
Start: 2025-03-18 | End: 2025-03-18

## 2025-03-18 RX ORDER — CHLORHEXIDINE GLUCONATE ORAL RINSE 1.2 MG/ML
10 SOLUTION DENTAL 2 TIMES DAILY
Status: DISCONTINUED | OUTPATIENT
Start: 2025-03-18 | End: 2025-03-18 | Stop reason: HOSPADM

## 2025-03-18 RX ORDER — ONDANSETRON HYDROCHLORIDE 2 MG/ML
INJECTION, SOLUTION INTRAVENOUS
Status: DISCONTINUED | OUTPATIENT
Start: 2025-03-18 | End: 2025-03-18

## 2025-03-18 RX ORDER — ASPIRIN 81 MG/1
81 TABLET ORAL 2 TIMES DAILY
Qty: 42 TABLET | Refills: 0 | Status: SHIPPED | OUTPATIENT
Start: 2025-03-19 | End: 2025-04-09

## 2025-03-18 RX ORDER — BUPIVACAINE HYDROCHLORIDE 2.5 MG/ML
INJECTION, SOLUTION EPIDURAL; INFILTRATION; INTRACAUDAL; PERINEURAL
Status: DISCONTINUED | OUTPATIENT
Start: 2025-03-18 | End: 2025-03-18 | Stop reason: HOSPADM

## 2025-03-18 RX ORDER — MUPIROCIN 20 MG/G
OINTMENT TOPICAL
Status: DISCONTINUED | OUTPATIENT
Start: 2025-03-18 | End: 2025-03-18 | Stop reason: HOSPADM

## 2025-03-18 RX ORDER — MIDAZOLAM HYDROCHLORIDE 1 MG/ML
INJECTION INTRAMUSCULAR; INTRAVENOUS
Status: DISCONTINUED | OUTPATIENT
Start: 2025-03-18 | End: 2025-03-18

## 2025-03-18 RX ORDER — BUPIVACAINE HYDROCHLORIDE 2.5 MG/ML
INJECTION, SOLUTION EPIDURAL; INFILTRATION; INTRACAUDAL; PERINEURAL
Status: DISCONTINUED
Start: 2025-03-18 | End: 2025-03-18 | Stop reason: HOSPADM

## 2025-03-18 RX ORDER — OXYCODONE AND ACETAMINOPHEN 5; 325 MG/1; MG/1
1 TABLET ORAL
Qty: 60 TABLET | Refills: 0 | Status: SHIPPED | OUTPATIENT
Start: 2025-03-18 | End: 2025-03-18

## 2025-03-18 RX ORDER — LIDOCAINE HYDROCHLORIDE 10 MG/ML
INJECTION, SOLUTION EPIDURAL; INFILTRATION; INTRACAUDAL; PERINEURAL
Status: DISCONTINUED | OUTPATIENT
Start: 2025-03-18 | End: 2025-03-18 | Stop reason: HOSPADM

## 2025-03-18 RX ORDER — MUPIROCIN 20 MG/G
OINTMENT TOPICAL
Status: CANCELLED | OUTPATIENT
Start: 2025-03-18

## 2025-03-18 RX ORDER — OXYCODONE AND ACETAMINOPHEN 5; 325 MG/1; MG/1
1 TABLET ORAL
Qty: 45 TABLET | Refills: 0 | Status: SHIPPED | OUTPATIENT
Start: 2025-03-18 | End: 2025-04-02

## 2025-03-18 RX ORDER — SODIUM CHLORIDE 9 MG/ML
INJECTION, SOLUTION INTRAVENOUS CONTINUOUS
Status: CANCELLED | OUTPATIENT
Start: 2025-03-18

## 2025-03-18 RX ORDER — ALBUTEROL SULFATE 0.83 MG/ML
2.5 SOLUTION RESPIRATORY (INHALATION) EVERY 4 HOURS PRN
Status: DISCONTINUED | OUTPATIENT
Start: 2025-03-18 | End: 2025-03-18 | Stop reason: HOSPADM

## 2025-03-18 RX ORDER — HYDROCODONE BITARTRATE AND ACETAMINOPHEN 5; 325 MG/1; MG/1
1 TABLET ORAL EVERY 4 HOURS PRN
Status: DISCONTINUED | OUTPATIENT
Start: 2025-03-18 | End: 2025-03-18 | Stop reason: HOSPADM

## 2025-03-18 RX ORDER — DOCUSATE SODIUM 100 MG/1
100 CAPSULE, LIQUID FILLED ORAL 2 TIMES DAILY
Qty: 30 CAPSULE | Refills: 0 | Status: SHIPPED | OUTPATIENT
Start: 2025-03-18

## 2025-03-18 RX ORDER — DEXAMETHASONE SODIUM PHOSPHATE 4 MG/ML
INJECTION, SOLUTION INTRA-ARTICULAR; INTRALESIONAL; INTRAMUSCULAR; INTRAVENOUS; SOFT TISSUE
Status: DISCONTINUED | OUTPATIENT
Start: 2025-03-18 | End: 2025-03-18

## 2025-03-18 RX ORDER — PROPOFOL 10 MG/ML
INJECTION, EMULSION INTRAVENOUS
Status: DISCONTINUED | OUTPATIENT
Start: 2025-03-18 | End: 2025-03-18

## 2025-03-18 RX ORDER — ONDANSETRON HYDROCHLORIDE 2 MG/ML
4 INJECTION, SOLUTION INTRAVENOUS ONCE AS NEEDED
Status: COMPLETED | OUTPATIENT
Start: 2025-03-18 | End: 2025-03-18

## 2025-03-18 RX ORDER — DEXMEDETOMIDINE HYDROCHLORIDE 100 UG/ML
INJECTION, SOLUTION INTRAVENOUS
Status: DISCONTINUED | OUTPATIENT
Start: 2025-03-18 | End: 2025-03-18

## 2025-03-18 RX ORDER — EPINEPHRINE 1 MG/ML
INJECTION, SOLUTION, CONCENTRATE INTRAVENOUS
Status: DISCONTINUED | OUTPATIENT
Start: 2025-03-18 | End: 2025-03-18 | Stop reason: HOSPADM

## 2025-03-18 RX ORDER — CEFAZOLIN 2 G/1
2 INJECTION, POWDER, FOR SOLUTION INTRAMUSCULAR; INTRAVENOUS
Status: COMPLETED | OUTPATIENT
Start: 2025-03-18 | End: 2025-03-18

## 2025-03-18 RX ORDER — SODIUM CHLORIDE 9 MG/ML
INJECTION, SOLUTION INTRAVENOUS CONTINUOUS
Status: DISCONTINUED | OUTPATIENT
Start: 2025-03-18 | End: 2025-03-18 | Stop reason: HOSPADM

## 2025-03-18 RX ORDER — EPINEPHRINE 1 MG/ML
INJECTION, SOLUTION, CONCENTRATE INTRAVENOUS
Status: DISCONTINUED
Start: 2025-03-18 | End: 2025-03-18 | Stop reason: HOSPADM

## 2025-03-18 RX ORDER — CEFAZOLIN SODIUM 2 G/50ML
2 SOLUTION INTRAVENOUS
Status: CANCELLED | OUTPATIENT
Start: 2025-03-18

## 2025-03-18 RX ORDER — FENTANYL CITRATE 50 UG/ML
25 INJECTION, SOLUTION INTRAMUSCULAR; INTRAVENOUS EVERY 5 MIN PRN
Status: DISCONTINUED | OUTPATIENT
Start: 2025-03-18 | End: 2025-03-18 | Stop reason: HOSPADM

## 2025-03-18 RX ORDER — LIDOCAINE HYDROCHLORIDE 20 MG/ML
INJECTION INTRAVENOUS
Status: DISCONTINUED | OUTPATIENT
Start: 2025-03-18 | End: 2025-03-18

## 2025-03-18 RX ORDER — FENTANYL CITRATE 50 UG/ML
INJECTION, SOLUTION INTRAMUSCULAR; INTRAVENOUS
Status: DISCONTINUED | OUTPATIENT
Start: 2025-03-18 | End: 2025-03-18

## 2025-03-18 RX ORDER — HYDROCODONE BITARTRATE AND ACETAMINOPHEN 5; 325 MG/1; MG/1
1 TABLET ORAL
Status: DISCONTINUED | OUTPATIENT
Start: 2025-03-18 | End: 2025-03-18 | Stop reason: HOSPADM

## 2025-03-18 RX ORDER — ONDANSETRON 4 MG/1
4 TABLET, FILM COATED ORAL EVERY 8 HOURS PRN
Qty: 30 TABLET | Refills: 0 | Status: SHIPPED | OUTPATIENT
Start: 2025-03-18

## 2025-03-18 RX ORDER — CEPHALEXIN 500 MG/1
500 CAPSULE ORAL EVERY 12 HOURS
Qty: 10 CAPSULE | Refills: 0 | Status: SHIPPED | OUTPATIENT
Start: 2025-03-18 | End: 2025-03-23

## 2025-03-18 RX ORDER — LIDOCAINE HYDROCHLORIDE 10 MG/ML
INJECTION, SOLUTION EPIDURAL; INFILTRATION; INTRACAUDAL; PERINEURAL
Status: DISCONTINUED
Start: 2025-03-18 | End: 2025-03-18 | Stop reason: HOSPADM

## 2025-03-18 RX ADMIN — FENTANYL CITRATE 25 MCG: 50 INJECTION INTRAMUSCULAR; INTRAVENOUS at 04:03

## 2025-03-18 RX ADMIN — DEXMEDETOMIDINE HYDROCHLORIDE 8 MCG: 100 INJECTION, SOLUTION INTRAVENOUS at 01:03

## 2025-03-18 RX ADMIN — ONDANSETRON 4 MG: 2 INJECTION INTRAMUSCULAR; INTRAVENOUS at 03:03

## 2025-03-18 RX ADMIN — ONDANSETRON 4 MG: 2 INJECTION INTRAMUSCULAR; INTRAVENOUS at 04:03

## 2025-03-18 RX ADMIN — CEFAZOLIN 2 G: 2 INJECTION, POWDER, FOR SOLUTION INTRAMUSCULAR; INTRAVENOUS at 01:03

## 2025-03-18 RX ADMIN — MIDAZOLAM 2 MG: 1 INJECTION INTRAMUSCULAR; INTRAVENOUS at 01:03

## 2025-03-18 RX ADMIN — FENTANYL CITRATE 50 MCG: 50 INJECTION, SOLUTION INTRAMUSCULAR; INTRAVENOUS at 01:03

## 2025-03-18 RX ADMIN — SODIUM CHLORIDE, POTASSIUM CHLORIDE, SODIUM LACTATE AND CALCIUM CHLORIDE: 600; 310; 30; 20 INJECTION, SOLUTION INTRAVENOUS at 01:03

## 2025-03-18 RX ADMIN — HYDROCODONE BITARTRATE AND ACETAMINOPHEN 1 TABLET: 5; 325 TABLET ORAL at 04:03

## 2025-03-18 RX ADMIN — ACETAMINOPHEN 1000 MG: 10 INJECTION, SOLUTION INTRAVENOUS at 01:03

## 2025-03-18 RX ADMIN — PROPOFOL 150 MG: 10 INJECTION, EMULSION INTRAVENOUS at 01:03

## 2025-03-18 RX ADMIN — LIDOCAINE HYDROCHLORIDE 50 MG: 20 INJECTION INTRAVENOUS at 01:03

## 2025-03-18 RX ADMIN — DEXMEDETOMIDINE HYDROCHLORIDE 4 MCG: 100 INJECTION, SOLUTION INTRAVENOUS at 02:03

## 2025-03-18 RX ADMIN — ROPIVACAINE HYDROCHLORIDE 20 ML: 5 INJECTION, SOLUTION EPIDURAL; INFILTRATION; PERINEURAL at 01:03

## 2025-03-18 RX ADMIN — DEXAMETHASONE SODIUM PHOSPHATE 4 MG: 4 INJECTION, SOLUTION INTRA-ARTICULAR; INTRALESIONAL; INTRAMUSCULAR; INTRAVENOUS; SOFT TISSUE at 01:03

## 2025-03-18 RX ADMIN — PROPOFOL 50 MG: 10 INJECTION, EMULSION INTRAVENOUS at 01:03

## 2025-03-18 NOTE — TRANSFER OF CARE
"Anesthesia Transfer of Care Note    Patient: Dang Dia Head    Procedure(s) Performed: Procedure(s) (LRB):  Left knee arthroscopy, possible parameniscal cyst excision, open reduction internal fixation of tibial insufficiency fracture with Ossiofiber, lateral meniscus repair (Left)    Patient location: PACU    Anesthesia Type: general    Transport from OR: Transported from OR on room air with adequate spontaneous ventilation    Post pain: adequate analgesia    Post assessment: no apparent anesthetic complications and tolerated procedure well    Post vital signs: stable    Level of consciousness: awake    Nausea/Vomiting: no nausea/vomiting    Complications: none    Transfer of care protocol was followed      Last vitals: Visit Vitals  /74 (BP Location: Right arm, Patient Position: Lying)   Pulse 82   Temp 36.7 °C (98 °F) (Temporal)   Resp 17   Ht 5' 2" (1.575 m)   Wt 58 kg (127 lb 13.9 oz)   LMP 02/23/2025 (Exact Date)   SpO2 99%   Breastfeeding No   BMI 23.39 kg/m²     "

## 2025-03-18 NOTE — ANESTHESIA PROCEDURE NOTES
Peripheral Block    Patient location during procedure: pre-op   Block not for primary anesthetic.  Reason for block: at surgeon's request and post-op pain management   Post-op Pain Location: Left knee   Start time: 3/18/2025 1:11 PM  Timeout: 3/18/2025 1:11 PM   End time: 3/18/2025 1:16 PM    Staffing  Authorizing Provider: Lyric Laurent MD  Performing Provider: Lyric Laurent MD    Staffing  Other anesthesia staff: Maribel Boyer CRNA  Performed by: Lryic Laurent MD  Authorized by: Lyric Laurent MD    Preanesthetic Checklist  Completed: patient identified, IV checked, site marked, risks and benefits discussed, surgical consent, monitors and equipment checked, pre-op evaluation and timeout performed  Peripheral Block  Patient position: supine  Prep: ChloraPrep  Patient monitoring: heart rate, cardiac monitor, continuous pulse ox, continuous capnometry and frequent blood pressure checks  Block type: adductor canal  Laterality: left  Injection technique: single shot  Needle  Needle type: Stimuplex   Needle gauge: 20 G  Needle length: 4 in  Needle localization: anatomical landmarks and ultrasound guidance   -ultrasound image captured on disc.  Assessment  Injection assessment: negative aspiration, negative parasthesia and local visualized surrounding nerve  Paresthesia pain: none  Heart rate change: no  Slow fractionated injection: yes  Pain Tolerance: comfortable throughout block and no complaints  Medications:    Medications: ropivacaine (NAROPIN) injection 0.5% - Perineural   20 mL - 3/18/2025 1:16:00 PM    Additional Notes  VSS.  [facility] RN monitoring vitals throughout procedure.  Patient tolerated procedure well.

## 2025-03-18 NOTE — OP NOTE
[facility] -  Orthopaedic Surgery & Sports Medicine  [facility] Periop Services    OPERATIVE NOTE    Procedure Date: 3/18/2025     Preoperative Diagnosis:  Parameniscal Cyst of anterior horn of lateral meniscus of left knee [M23.042]  Left knee tibial subchondral insufficiency fracture    Postoperative Diagnosis:  Parameniscal Cyst of anterior horn of lateral meniscus of left knee [M23.042]  Left knee tibial subchondral insufficiency fracture  Left knee lateral meniscus tear of anterior horn (vertical split tear)    Surgeon: Yovanny Matthews MD    Assistant: Kourtney Mauricio PA-C (first assist) and Ludmila Camilo [facility] OTC. Skilled assistance was needed for assistance with limb positioning and holding the scope during cyst excision, lateral meniscus repair, and tibial insufficiency fracture fixation.    Procedure Performed:  Left knee arthroscopically guided fixation of tibial insufficiency fracture (CPT 79730)  Left knee athroscopic repair of lateral meniscus tear  Left knee athroscopic excision of parameniscal cyst through elongated incision    Anesthesia: General     Block: None    Fluids: Per Anesthesia Record    UOP: Per Anesthesia Record    Blood Loss: * No values recorded between 3/18/2025  1:19 PM and 3/18/2025  3:45 PM *    Implants:   Implant Name Type Inv. Item Serial No.  Lot No. LRB No. Used Action   Trimmable Fixation Nail System 4 x 70mm     [id number] Left 1 Implanted       Specimens:   Specimen (24h ago, onward)       Start     Ordered    03/18/25 1421  Specimen to Pathology, Surgery Orthopedics  Once        Comments: Pre-op Diagnosis: Bone bruise [T14.8XXA]Cyst of anterior horn of lateral meniscus of left knee [M23.042]Procedure(s):Left knee arthroscopy, possible parameniscal cyst excision, possible fixation of tibial subchondral bone injury with Ossiofiber, any indicated procedures. Number of specimens: 1Name of specimens: 1. Left parameniscal cyst     References:    Click here for  ordering Quick Tip   Question Answer Comment   Procedure Type: Orthopedics    Specimen Class: Routine/Screening    Release to patient Immediate        03/18/25 1512                     Drains: None    Tourniquet Time: none    Complications: No    Fluoro/C-arm utilized during the case: C-arm was utilized during fracture fixation    Preoperative Exam Under Anesthesia Findings:   ROM: -5 to 145  Lachman: 1A   Pivot Shift: Negative   Anterior Drawer: Negative   Posterior Drawer: Negative  Varus @ 0: Stable  Varus @ 30: Stable   Dial Test @ 0: Negative  Dial Test @ 30: Negative  Valgus @ 0: Stable  Valgus @ 30: Stable    Intraoperative Findings:   ACL: Intact  PCL: Intact  Medial Meniscus: Normal  Lateral Meniscus: There was fraying of the lateral meniscus posterior root. It was partial thickness and did not affect the structural integrity of the root. There was a small amount of fraying in the body that was very minor and did not affect the structural integrity of the meniscus. There was a 1.5cm vertical split tear in the anterior horn that also had a stalk coming out of it that communicated with the parameniscal cyst. After removal of the cyst, the tear was unstable to probing and tracked full thickness down through the anterior horn.   MFC: Normal  MTP: normal  LFC: normal  LTP: normal  Patella: normal  Trochlea: normal  Gutters: normal    Indications for Procedure and Brief History: This is a 19 year old female who had a long history of anterior knee pain that failed conservative management including injections, physical therapy, analgesics, and activity modification. The pain hurt her on a daily basis. It was localized in two regions, the anterolateral joint line and the anterior proximal tibia, both of which were symptomatic with palpation. She had been seen by another provider initially and had an MRI a few months ago that suggested a parameniscal cysts. We ordered a new MRI for improved resolution and interval  follow-up. The new MRI reaffirmed the parameniscal cyst and had concern for possible anterior horn lateral meniscus tear. It also demonstrated interval development of bone marrow edema in the anterior proximal tibia consistent with her symptoms.    I had a long discussion with the patient and her family about treatment options. We discussed operative and non-operative options. We discussed the risks of surgery at length, including but not limited to pain, infection, bleeding, damage to adjacent structures such as nerves and blood vessels, failure to heal, stiffness, laxity, need for more surgery, stroke, blood clot, loss of life, loss of limb, need for removal of any implants used, anesthesia risks, breathing problems, and heart problems. We talked about common and uncommon risks. We discussed risks that were higher specific to the patient.     We talked about surgery options, including cyst excision, potential for meniscus repair, and potential for fixation of the anterior proximal tibia with ossiofiber which was a newer implant similar to a screw but that converts to bone over the course of 2 years. They consented to those procedures and any indicated procedures. They expressed understanding of the risks and opted to proceed with surgical management.    Description of Procedure: I met the the patient in the preoperative holding area. I identified, confirmed, and marked the operative extremity. All questions were answered. The patient was then taken back to the operating room and transferred to the operative table. The patient was placed in the supine position. All bony prominences were padded. A foot pad was placed to assist positioning at 90 degrees and at hyperflexion. Anesthesia was induced without complication. A tourniquet with adequate padding was placed at the proximal thigh. The operative extremity was then prepped and draped in the standard sterile fashion with a chlorhexidine and alcohol solution. A  timeout was performed to ensure we had the proper patient, proper operative site, and were performing the proper procedure. All members of the operative team were in agreement with this. Intravenous antibiotics were administered within 60 minutes prior to the incision.     I began the procedure by performing a diagnostic arthroscopy.  I made my initial portal with a 11 knife anterolaterally, just adjacent to the patellar tendon next to the inferior pole of the patella. I then made an anteromedial portal utilizing a spinal needle for localization under arthroscopic visualization. I made the first portal adjacent to the medial border of the patellar tendon and just above the meniscus. I then gently resected excess fat pad with an arthroscopic shaver only as needed for visualization. I then performed a standard diagnostic arthroscopy, examining all compartments and intra-articular spaces of the knee. The key findings are listed above. I switched between all of the portals for viewing and instrumentation throughout the case as needed, and switched between and 30 degree and 70 degree scope as needed.    We started with excision of the cyst.  We used a combination of arthroscopic scissors as well as meniscal biters of various different angles to detached the cyst from its base.  It did have a stalk that appeared to communicate more inferiorly and tracked through a vertical type tear that was in the anterior horn of the lateral meniscus in to some degree at the cleft of the anterior horn and inner meniscal ligament.  We took care to try not to pop the cyst so that we could get all cyst contents and we were able to do that well.  We utilized both of our portals to instrument through and view through.  We are able to elongate are anteromedial portal and pull the cyst out with arthroscopic graspers.  We photographed this on the back table which was in 2 pieces sent it to pathology for analysis.    Meniscus Surgery:  We then  moved on to the meniscus repair.  We debrided out the fibrotic tissue at the tear site where the cyst stalk had been going through.  We placed 3 loop sutures 5 mm apart to span the tear site.  We passed these with an Arthrex Lasso and tied knots arthroscopically.  It was stable to probing after this and we had compressed and we tensioned the entire tear site.    Additional Surgical Procedures:  We then moved on to treatment of the tibial insufficiency fracture.  We did this with x-ray guidance.  We made a small 0.5 cm incision at Gerdy's tubercle based on our preoperative planning for the site of most significant bone involvement.  We visualized with AP oblique and lateral radiographs intraoperatively to advance the pin from the anterolateral cortex to the anteromedial cortex.  Once we confirmed appropriate pin placement we over-drilled this with a 4.0 mm cannulated Reamer and measured to approximately 55 mm.  We chose a 50 mm Ossiofiber implant (we trimmed to size).  We then advanced this over our K-wire under direct visualization until it was flush on both cortices and there was no prominence.  We removed our K-wire.  We confirmed placement with multiple orthogonal views under x-ray.  There was good placement of the implant right at the site of most significant bone involvement.  We then irrigated the wounds with copious amounts of sterile saline    We performed a layered closure using 2-0 vicryl in the subcutaneous tissue, and monocryl in the skin. We placed sterile dressings over the wound. All sponge, instrument, and needle counts were correct x 2 at the end of the case. I was present and performed all key portions of the procedure. The patient was awoken from anesthesia transported to the PACU in stable condition. The patient was examined postoperatively and the limb was warm and well perfused with appropriate neurovascular status relative to preoperative status and block status.     Condition:  Good    Disposition: PACU - hemodynamically stable.    Attestation: I was present and scrubbed for the entire procedure.    Postoperative Plan:  Weight bearing:  Toe-touch weight-bearing for 2  Range of motion:  As tolerated  DVT Ppx:  Aspirin 81 mg twice a day for 3 weeks  PT/OT: Start POD#2 or #3  Follow-up: 10-14 days        Yovanny Matthews MD  Orthopaedic Surgery & Sports Medicine

## 2025-03-18 NOTE — BRIEF OP NOTE
[facility] Periop Services  Brief Operative Note    Surgery Date: 3/18/2025     Surgeons and Role:     * Yovanny aMtthews MD - Primary    Assisting Surgeon: Kourtney Christian PA-C    Pre-op Diagnosis:  Bone bruise [T14.8XXA]  Cyst of anterior horn of lateral meniscus of left knee [M23.042]    Post-op Diagnosis:  Post-Op Diagnosis Codes:     * Bone bruise [T14.8XXA]     * Cyst of anterior horn of lateral meniscus of left knee [M23.042]    Procedure(s) (LRB):  Left knee arthroscopy, possible parameniscal cyst excision, open reduction internal fixation of tibial insufficiency fracture with Ossiofiber, lateral meniscus repair (Left)    Anesthesia: General    Operative Findings: Left knee scope, parameniscal cyst removal, lateral meniscus repir, and ORIF with ossiofiber    Estimated Blood Loss: * No values recorded between 3/18/2025  1:19 PM and 3/18/2025  3:45 PM *         Specimens:   Specimen (24h ago, onward)       Start     Ordered    03/18/25 1421  Specimen to Pathology, Surgery Orthopedics  Once        Comments: Pre-op Diagnosis: Bone bruise [T14.8XXA]Cyst of anterior horn of lateral meniscus of left knee [M23.042]Procedure(s):Left knee arthroscopy, possible parameniscal cyst excision, possible fixation of tibial subchondral bone injury with Ossiofiber, any indicated procedures. Number of specimens: 1Name of specimens: 1. Left parameniscal cyst     References:    Click here for ordering Quick Tip   Question Answer Comment   Procedure Type: Orthopedics    Specimen Class: Routine/Screening    Release to patient Immediate        03/18/25 1512                    * No specimens in log *        Discharge Note    OUTCOME: Patient tolerated treatment/procedure well without complication and is now ready for discharge.    DISPOSITION: Home or Self Care    FINAL DIAGNOSIS:  Cyst of the anterior horn of the lateral meniscus of the left knee    FOLLOWUP: In clinic    DISCHARGE INSTRUCTIONS:  No discharge procedures on  file.

## 2025-03-18 NOTE — PLAN OF CARE
Adductor nerve block performed by Dr. Laurent with Argenis RN and Lukas RN at bedside. Pt on continuous cardiac monitoring. Patient tolerated well.

## 2025-03-18 NOTE — INTERVAL H&P NOTE
The patient has been examined and the H&P has been reviewed:    I concur with the findings and no changes have occurred since H&P was written.    Anesthesia/Surgery risks, benefits and alternative options discussed and understood by patient/family.          There are no hospital problems to display for this patient.    Plan:  Left knee arthroscopy, possible parameniscal cyst excision, possible fixation of tibial subchondral bone injury with Ossiofiber, any indicated procedures     I had a long discussion with the patient about treatment options, including operative and nonoperative treatments. We discussed pros and cons of each including risks pertinent to surgery including pain, infection, bleeding, damage to adjacent structures like nerves and blood vessels, failure to heal, need for future surgeries, stiffness, instability, loss of limb, anesthesia risks like stroke, blood clot, loss of life. The details of the surgical procedure were explained, including the location of probable incisions and a description of possible hardware and/or grafts to be used. Alternatives to both operative and non-operative options with associated risks and benefits were discussed.     The patient understands the likely convalescence after surgery and, in particular, the expected postop rehab and recovery course. The outlined risks and potential complications of the proposed procedure include but are not limited to: infection, poor wound healing, scarring, deformity, stiffness, swelling, continued or recurrent pain, instability, hardware or prosthetic failure if implanted, symptomatic hardware requiring removal, dislocation, weakness, neurovascular injury, numbness, chronic regional pain disorder, tissue nonhealing/irreparability/retear, subsequent contralateral limb injury or pathology, chondral injury, arthritis, fracture, blood clot formation, inability to return to previous level of activity, anesthetic or regional block  complication up to death, need for additional procedure as indicated intraoperatively, and potential need for further surgery.     The patient was also informed and understands that the risks of surgery are greater for patients with a current condition or history of heart disease, obesity, clotting disorders, recurrent infections, steroid use, current or past smoking, and factors such as sedentary lifestyle and noncompliance with medications, therapy or follow-up. The degree of the increased risk is hard to estimate with any degree of precision. If applicable, smoking cessation was discussed. We discussed the possibility of need for later hardware removal in the case that hardware was used. We discussed common and uncommon risks, and discussed patient specific factors that may increase the risks present with surgery. All questions were answered. The patient expressed understanding of the pros and cons of surgery and wanted to proceed with surgical treatment.    We discussed COVID19 with the patient, they are aware of our current policies and procedures, were given the option of delaying surgery, and they elect to proceed. All questions were answered.

## 2025-03-18 NOTE — ANESTHESIA PROCEDURE NOTES
Intubation    Date/Time: 3/18/2025 1:27 PM    Performed by: Maribel Boyer CRNA  Authorized by: Lyric Laurent MD    Intubation:     Induction:  Intravenous    Intubated:  Postinduction    Mask Ventilation:  N/a    Attempts:  1    Attempted By:  CRNA    Difficult Airway Encountered?: No      Complications:  None    Airway Device:  Supraglottic airway/LMA    Airway Device Size:  3.0    Style/Cuff Inflation:  Cuffed (inflated to minimal occlusive pressure)    Placement Verified By:  Capnometry    Complicating Factors:  None    Findings Post-Intubation:  BS equal bilateral and atraumatic/condition of teeth unchanged  Notes:      I gel #3 placed- no complications noted

## 2025-03-18 NOTE — DISCHARGE SUMMARY
"[facility] Services  Discharge Note  Short Stay    Procedure(s) (LRB):  Left knee arthroscopy, possible parameniscal cyst excision, open reduction internal fixation of tibial insufficiency fracture with Ossiofiber, lateral meniscus repair (Left)      OUTCOME: Patient tolerated treatment/procedure well without complication and is now ready for discharge.    DISPOSITION: Home or Self Care    FINAL DIAGNOSIS: Left knee parameniscus cyst    FOLLOWUP: In clinic    DISCHARGE INSTRUCTIONS:    Discharge Procedure Orders   CRUTCHES FOR HOME USE     Order Specific Question Answer Comments   Type: Axillary    Height: 5' 2" (1.575 m)    Weight: 58 kg (127 lb 13.9 oz)    Length of need (1-99 months): 3 weeks     Diet general     Call MD for:  temperature >100.4     Call MD for:  persistent nausea and vomiting     Call MD for:  severe uncontrolled pain     Call MD for:  difficulty breathing, headache or visual disturbances     Call MD for:  redness, tenderness, or signs of infection (pain, swelling, redness, odor or green/yellow discharge around incision site)     Call MD for:  hives     Call MD for:  persistent dizziness or light-headedness     Call MD for:  extreme fatigue     Leave dressing on - Keep it clean, dry, and intact until clinic visit        TIME SPENT ON DISCHARGE: 30 minutes  "

## 2025-03-19 NOTE — ANESTHESIA POSTPROCEDURE EVALUATION
Anesthesia Post Evaluation    Patient: Dang Dia Head    Procedure(s) Performed: Procedure(s) (LRB):  Left knee arthroscopy, possible parameniscal cyst excision, open reduction internal fixation of tibial insufficiency fracture with Ossiofiber, lateral meniscus repair (Left)    Final Anesthesia Type: general      Patient location during evaluation: PACU  Patient participation: Yes- Able to Participate  Level of consciousness: awake and alert and oriented  Post-procedure vital signs: reviewed and stable  Pain management: adequate  Airway patency: patent    PONV status at discharge: No PONV  Anesthetic complications: no      Cardiovascular status: blood pressure returned to baseline, stable and hemodynamically stable  Respiratory status: unassisted  Hydration status: euvolemic  Follow-up not needed.              Vitals Value Taken Time   /63 03/18/25 16:40   Temp 36.7 °C (98.1 °F) 03/18/25 16:40   Pulse 74 03/18/25 16:42   Resp 14 03/18/25 16:42   SpO2 100 % 03/18/25 16:42   Vitals shown include unfiled device data.      Event Time   Out of Recovery 16:32:00         Pain/Larry Score: Pain Rating Prior to Med Admin: 7 (3/18/2025  4:21 PM)  Larry Score: 10 (3/18/2025  4:50 PM)

## 2025-03-20 LAB
FINAL PATHOLOGIC DIAGNOSIS: NORMAL
GROSS: NORMAL
Lab: NORMAL

## 2025-03-24 ENCOUNTER — PATIENT MESSAGE (OUTPATIENT)
Dept: SPORTS MEDICINE | Facility: CLINIC | Age: 20
End: 2025-03-24
Payer: COMMERCIAL

## 2025-03-27 ENCOUNTER — PATIENT MESSAGE (OUTPATIENT)
Dept: SPORTS MEDICINE | Facility: CLINIC | Age: 20
End: 2025-03-27
Payer: COMMERCIAL

## 2025-03-31 ENCOUNTER — TELEPHONE (OUTPATIENT)
Dept: SPORTS MEDICINE | Facility: CLINIC | Age: 20
End: 2025-03-31
Payer: COMMERCIAL

## 2025-03-31 ENCOUNTER — OFFICE VISIT (OUTPATIENT)
Dept: SPORTS MEDICINE | Facility: CLINIC | Age: 20
End: 2025-03-31
Payer: COMMERCIAL

## 2025-03-31 DIAGNOSIS — M84.469A INSUFFICIENCY FRACTURE OF TIBIA, INITIAL ENCOUNTER: ICD-10-CM

## 2025-03-31 DIAGNOSIS — Z98.890 S/P LATERAL MENISCUS REPAIR OF LEFT KNEE: ICD-10-CM

## 2025-03-31 DIAGNOSIS — Z98.890 S/P ARTHROSCOPIC SURGERY OF LEFT KNEE: ICD-10-CM

## 2025-03-31 DIAGNOSIS — M23.042 CYST OF ANTERIOR HORN OF LATERAL MENISCUS OF LEFT KNEE: ICD-10-CM

## 2025-03-31 DIAGNOSIS — M25.562 CHRONIC PATELLOFEMORAL PAIN OF LEFT KNEE: Primary | ICD-10-CM

## 2025-03-31 DIAGNOSIS — M84.38XA STRESS FRACTURE OF OTHER SITE, INITIAL ENCOUNTER: ICD-10-CM

## 2025-03-31 DIAGNOSIS — G89.29 CHRONIC PATELLOFEMORAL PAIN OF LEFT KNEE: Primary | ICD-10-CM

## 2025-03-31 PROCEDURE — 99999 PR PBB SHADOW E&M-EST. PATIENT-LVL II: CPT | Mod: PBBFAC,,, | Performed by: PHYSICIAN ASSISTANT

## 2025-04-02 ENCOUNTER — TELEPHONE (OUTPATIENT)
Dept: SPORTS MEDICINE | Facility: CLINIC | Age: 20
End: 2025-04-02
Payer: COMMERCIAL

## 2025-04-02 DIAGNOSIS — M25.562 CHRONIC PATELLOFEMORAL PAIN OF LEFT KNEE: Primary | ICD-10-CM

## 2025-04-02 DIAGNOSIS — G89.29 CHRONIC PATELLOFEMORAL PAIN OF LEFT KNEE: Primary | ICD-10-CM

## 2025-04-02 NOTE — PATIENT INSTRUCTIONS
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   Post-op    Encounter Diagnoses   Name Primary?    Chronic patellofemoral pain of left knee Yes    Insufficiency fracture of tibia, initial encounter     Cyst of anterior horn of lateral meniscus of left knee     Stress fracture of other site, initial encounter     S/P lateral meniscus repair of left knee     S/P arthroscopic surgery of left knee         Plan:  PT/OT:   Continue physical therapy with [facility] in [place]   Medications:    Use OTC hydrocortisone cream and zyrtec as needed   Finish 21 days of aspirin to prevent blood clots.   DME and weight bearing status:    Discussed discontinuing TROM and ACE wrap due to rash  Start to progress into full weight bearing   Range of motion as tolerated  Advanced Imaging:   Xrays AP and Lat at next visit    Reviewed pathology   Reviewed surgical images  Referral:    Discussed referral to derm if no improvement with the rash   Education:    Post-op recovery and timeline    Return to clinic:    1 week for a recheck on rash    Imaging needed at next follow-up: Will get xrays at next visit      Follow-up: Wed in [place]. Please reach out to us sooner if there are any problems between now and then.    About Dr. Cassie Matthews's Research & Publications    Give us Feedback:   Google: Leave Google Review  Healthgrades: Leave Healthgrades Review

## 2025-04-02 NOTE — PROGRESS NOTES
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 She presents PWB: left lower extremity with TROM brace/assistive devices. The patient has started physical therapy at [facility] in [facility]. Patient has concern of a rash that is located on the top of her thigh which is causing itching.     Past Medical History:   Past Medical History:   Diagnosis Date    Asthma 2021 2:42:33 PM    [facility] Historical - Respiratory: Asthma-No Additional Notes    Asthma 2021 2:42:33 PM    [facility] Historical - Respiratory: Asthma-No Additional Notes    Non- jaundice 2021 2:42:30 PM    [facility] Historical - Quick Add: Jaundice-No Additional Notes    Non- jaundice 2021 2:42:30 PM    [facility] Historical - Quick Add: Jaundice-No Additional Notes     Past Surgical History:   Procedure Laterality Date    ARTHROSCOPY,KNEE,WITH MENISCUS REPAIR Left 3/18/2025    Procedure: Left knee arthroscopy, possible parameniscal cyst excision, open reduction internal fixation of tibial insufficiency fracture with Ossiofiber, lateral meniscus repair;  Surgeon: Yovanny Matthews MD;  Location: [facility];  Service: Orthopedics;  Laterality: Left;    TONSILLECTOMY       No family history on file.  Social History[1]  Medication List with Changes/Refills   Current Medications    ASPIRIN (ECOTRIN) 81 MG EC TABLET    Take 1 tablet (81 mg total) by mouth 2 (two) times daily. for 21 days    BUSPIRONE (BUSPAR) 10 MG TABLET    Take 1 tablet (10  mg total) by mouth 3 (three) times daily as needed (anxiety).    DOCUSATE SODIUM (COLACE) 100 MG CAPSULE    Take 1 capsule (100 mg total) by mouth 2 (two) times daily.    NORETHINDRONE-E.ESTRADIOL-IRON (LO LOESTRIN FE) 1 MG-10 MCG (24)/10 MCG (2) TAB    Take 1 tablet by mouth once daily.    ONDANSETRON (ZOFRAN) 4 MG TABLET    Take 1 tablet (4 mg total) by mouth every 8 (eight) hours as needed for Nausea.    OXYCODONE-ACETAMINOPHEN (PERCOCET) 5-325 MG PER TABLET    Take 1 tablet by mouth every 4 to 6 hours as needed for Pain. May take 1 to 2 tabs every 4 to 6 hours as needed for pain    SERTRALINE (ZOLOFT) 100 MG TABLET    1.5 tablets daily by mouth     Review of patient's allergies indicates:  No Known Allergies  ROS    Physical Exam:   There is no height or weight on file to calculate BMI.  There were no vitals filed for this visit.   GENERAL: Well appearing, appropriate for stated age, no acute distress.  CARDIOVASCULAR: Pulses regular by peripheral palpation.  PULMONARY: Respirations are even and non-labored.  NEURO: Awake, alert, and oriented x 3.  PSYCH: Mood & affect are appropriate.  HEENT: Head is normocephalic and atraumatic.    Ortho/SPM Exam  Left Knee Exam  Incision sites clean dry and intact, no signs of infection  Minimal effusion   Knee ROM full extension to 90 degrees flexion   All compartments are soft and compressible. Calf soft non-tender. Intact EHL, FHL, gastrocsoleus, and tibialis anterior. Sensation intact to light touch in superficial peroneal, deep peroneal, tibial, sural, and saphenous nerve distributions. Foot warm and well perfused with capillary refill of less than 2 seconds and palpable pedal pulses.   Mild erythema to the upper thigh consistent with a contact dermatitis from the ace wrap          Incisions healing well     Imaging:   XR Results:  Results for orders placed during the hospital encounter of 03/18/25    X-Ray Knee 1 or 2 View Left    Narrative  EXAM: XR KNEE 1 OR 2 VIEW  LEFT    CLINICAL HISTORY: Intraoperative    FINDINGS:  A series of 9 spot views of the left knee was performed.  Placement of hardware is visible in the proximal right tibia.  Please see operative report for further information.    Impression  Fluoroscopic assistance for left knee surgery.    Fluoroscopy time:  1:18    Finalized on: 3/20/2025 11:24 AM By:  Eric Stephens  [facility]# [id number]      2025-03-20 11:26:[id number]     [facility]        Relevant imaging results reviewed and interpreted by me, discussed with the patient and / or family today.      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   Post-op    Encounter Diagnoses   Name Primary?    Chronic patellofemoral pain of left knee Yes    Insufficiency fracture of tibia, initial encounter     Cyst of anterior horn of lateral meniscus of left knee     Stress fracture of other site, initial encounter     S/P lateral meniscus repair of left knee     S/P arthroscopic surgery of left knee         Plan:  PT/OT:   Continue physical therapy with [facility] in [place]   Medications:    Use OTC hydrocortisone cream and zyrtec as needed   Finish 21 days of aspirin to prevent blood clots.   DME and weight bearing status:    Discussed discontinuing TROM and ACE wrap due to rash  Start to progress into full weight bearing   Range of motion as tolerated  Advanced Imaging:   Xrays AP and Lat at next visit    Reviewed pathology   Reviewed surgical images  Referral:    Discussed referral to derm if no improvement with the rash   Education:    Post-op recovery and timeline    Return to clinic:    1 week for a recheck on rash    Imaging needed at next  follow-up: Will get xrays at next visit      Follow-up: Wed in [place]. Please reach out to us sooner if there are any problems between now and then.    About Dr. Cassie Matthews's Research & Publications    Give us Feedback:   Google: Leave Google Review  Healthgrades: Leave Healthgrades Review     Provider Note/Medical Decision Making:   Had a long discussion with Dr. Matthews about this patient in regards to her rash in the TROM brace rubbing the area we decided to and her weight-bearing status and range of motion status early due to the side effects from the brace.  Will start progress and to full weight-bearing will work with our therapist today and will continue to follow-up on the patient regarding her rash.    I discussed worrisome and red flag signs and symptoms with the patient. The patient expressed understanding and agreed to alert me immediately or to go to the emergency room if they experience any of these.   Treatment plan was developed with input from the patient/family, and they expressed understanding and agreement with the plan. All questions were answered today.      Kourtney Peralta PA-C  Sports Medicine Physician Assistant       Disclaimer: This note was prepared using a voice recognition system and is likely to have sound alike errors within the text.            [1]   Social History  Socioeconomic History    Marital status: Single   Tobacco Use    Smoking status: Never     Passive exposure: Never    Smokeless tobacco: Never   Substance and Sexual Activity    Alcohol use: Never    Drug use: Never    Sexual activity: Not Currently     Partners: Male     Social Drivers of Health     Financial Resource Strain: Low Risk  (3/10/2025)    Overall Financial Resource Strain (CARDIA)     Difficulty of Paying Living Expenses: Not very hard   Food Insecurity: No Food Insecurity (3/10/2025)    Hunger Vital Sign     Worried About Running Out of Food in the Last Year: Never true     Ran Out of Food  in the Last Year: Never true   Transportation Needs: No Transportation Needs (3/10/2025)    PRAPARE - Transportation     Lack of Transportation (Medical): No     Lack of Transportation (Non-Medical): No   Physical Activity: Inactive (3/10/2025)    Exercise Vital Sign     Days of Exercise per Week: 0 days     Minutes of Exercise per Session: 0 min   Stress: No Stress Concern Present (3/10/2025)    [employer] [place] [employer] - Occupational Stress Questionnaire     Feeling of Stress : Only a little   Housing Stability: Low Risk  (3/10/2025)    Housing Stability Vital Sign     Unable to Pay for Housing in the Last Year: No     Number of Times Moved in the Last Year: 0     Homeless in the Last Year: No

## 2025-04-07 ENCOUNTER — TELEPHONE (OUTPATIENT)
Dept: SPORTS MEDICINE | Facility: CLINIC | Age: 20
End: 2025-04-07
Payer: COMMERCIAL

## 2025-04-07 NOTE — TELEPHONE ENCOUNTER
LVM advising that Kourtney said it was okay to cancel the appointment this week and just see her at her 6 week post op appointment due to the rash going away. Told them to call back if they needed anything else.       ----- Message from Kate sent at 4/7/2025 10:48 AM CDT -----  Contact: Ifeoma/mother  Ifeoma is calling in regards to the pt Rash id gone away and wanting to cancel the appt on 04/09.please call pt mother at ..[phone] [facility]

## 2025-04-10 ENCOUNTER — OFFICE VISIT (OUTPATIENT)
Dept: INTERNAL MEDICINE | Facility: CLINIC | Age: 20
End: 2025-04-10
Payer: COMMERCIAL

## 2025-04-10 ENCOUNTER — LAB VISIT (OUTPATIENT)
Dept: LAB | Facility: HOSPITAL | Age: 20
End: 2025-04-10
Payer: COMMERCIAL

## 2025-04-10 VITALS
TEMPERATURE: 99 F | HEIGHT: 62 IN | DIASTOLIC BLOOD PRESSURE: 64 MMHG | RESPIRATION RATE: 16 BRPM | BODY MASS INDEX: 25.28 KG/M2 | OXYGEN SATURATION: 96 % | WEIGHT: 137.38 LBS | HEART RATE: 121 BPM | SYSTOLIC BLOOD PRESSURE: 108 MMHG

## 2025-04-10 DIAGNOSIS — Z00.00 ENCOUNTER FOR SCREENING AND PREVENTATIVE CARE: ICD-10-CM

## 2025-04-10 DIAGNOSIS — F33.1 MODERATE EPISODE OF RECURRENT MAJOR DEPRESSIVE DISORDER: ICD-10-CM

## 2025-04-10 DIAGNOSIS — O26.90 PREGNANCY-RELATED SYMPTOM, ANTEPARTUM: Primary | ICD-10-CM

## 2025-04-10 DIAGNOSIS — F41.1 GAD (GENERALIZED ANXIETY DISORDER): ICD-10-CM

## 2025-04-10 DIAGNOSIS — N64.4 BREAST TENDERNESS IN FEMALE: ICD-10-CM

## 2025-04-10 DIAGNOSIS — N92.1 PROLONGED MENSTRUATION: ICD-10-CM

## 2025-04-10 DIAGNOSIS — D50.9 IRON DEFICIENCY ANEMIA, UNSPECIFIED IRON DEFICIENCY ANEMIA TYPE: ICD-10-CM

## 2025-04-10 DIAGNOSIS — R14.0 BLOATING: ICD-10-CM

## 2025-04-10 DIAGNOSIS — R53.83 FATIGUE, UNSPECIFIED TYPE: ICD-10-CM

## 2025-04-10 PROCEDURE — 85025 COMPLETE CBC W/AUTO DIFF WBC: CPT

## 2025-04-10 PROCEDURE — 36415 COLL VENOUS BLD VENIPUNCTURE: CPT | Mod: PO

## 2025-04-10 PROCEDURE — 99213 OFFICE O/P EST LOW 20 MIN: CPT | Mod: S$GLB,,,

## 2025-04-10 PROCEDURE — 84443 ASSAY THYROID STIM HORMONE: CPT

## 2025-04-10 PROCEDURE — 3074F SYST BP LT 130 MM HG: CPT | Mod: CPTII,S$GLB,,

## 2025-04-10 PROCEDURE — 3008F BODY MASS INDEX DOCD: CPT | Mod: CPTII,S$GLB,,

## 2025-04-10 PROCEDURE — 80061 LIPID PANEL: CPT

## 2025-04-10 PROCEDURE — 1159F MED LIST DOCD IN RCRD: CPT | Mod: CPTII,S$GLB,,

## 2025-04-10 PROCEDURE — 99999 PR PBB SHADOW E&M-EST. PATIENT-LVL III: CPT | Mod: PBBFAC,,,

## 2025-04-10 PROCEDURE — 3078F DIAST BP <80 MM HG: CPT | Mod: CPTII,S$GLB,,

## 2025-04-10 PROCEDURE — 1160F RVW MEDS BY RX/DR IN RCRD: CPT | Mod: CPTII,S$GLB,,

## 2025-04-10 PROCEDURE — 80053 COMPREHEN METABOLIC PANEL: CPT

## 2025-04-10 NOTE — PROGRESS NOTES
"Subjective:       Patient ID: Dang Blandon is a 19 y.o. female.    Chief Complaint: No chief complaint on file.    History of Present Illness    CHIEF COMPLAINT:  Patient presents today for pregnancy evaluation.    PREGNANCY SYMPTOMS:  She reports experiencing fatigue, bloating, abdominal pain, breast tenderness, increased appetite, back pain, and urinary frequency. Home pregnancy tests have shown mixed results, with both positive and negative outcomes. She stopped her birth control on 4/8.  Her LMP was 3/31, which lasted 2 days.  Her last full cycle was 2/24-3/1.    MEDICATIONS:  She continues Zoloft and takes Buspar as needed. She has temporarily discontinued birth control pending further evaluation.        /64   Pulse (!) 121   Temp 99.3 °F (37.4 °C) (Tympanic)   Resp 16   Ht 5' 2" (1.575 m)   Wt 62.3 kg (137 lb 5.6 oz)   LMP 03/31/2025 (Exact Date)   SpO2 96%   BMI 25.12 kg/m²     Review of Systems   Constitutional:  Positive for appetite change. Negative for activity change, chills and fever.   HENT:  Negative for hearing loss, rhinorrhea and trouble swallowing.    Eyes:  Negative for discharge and visual disturbance.   Respiratory:  Negative for chest tightness, shortness of breath and wheezing.    Cardiovascular:  Negative for chest pain, palpitations and leg swelling.   Gastrointestinal:  Positive for abdominal pain. Negative for blood in stool, constipation, diarrhea, nausea and vomiting.   Endocrine: Positive for polyuria. Negative for polydipsia.   Genitourinary:  Positive for frequency. Negative for difficulty urinating, dysuria, hematuria and menstrual problem.   Musculoskeletal:  Positive for back pain. Negative for arthralgias, joint swelling and neck pain.   Neurological:  Positive for headaches. Negative for weakness.   Psychiatric/Behavioral:  Positive for confusion. Negative for dysphoric mood.    All other systems reviewed and are negative.      Objective:      Physical " Exam  Constitutional:       General: She is not in acute distress.     Appearance: Normal appearance. She is not ill-appearing or toxic-appearing.   HENT:      Head: Normocephalic and atraumatic.   Eyes:      Pupils: Pupils are equal, round, and reactive to light.   Cardiovascular:      Rate and Rhythm: Regular rhythm. Tachycardia present.   Pulmonary:      Effort: Pulmonary effort is normal.      Breath sounds: Normal breath sounds.   Abdominal:      General: Bowel sounds are normal.      Palpations: Abdomen is soft.   Musculoskeletal:         General: Normal range of motion.      Cervical back: Normal range of motion and neck supple.   Skin:     General: Skin is warm and dry.   Neurological:      General: No focal deficit present.      Mental Status: She is alert and oriented to person, place, and time.   Psychiatric:         Mood and Affect: Mood normal.         Behavior: Behavior normal.         Thought Content: Thought content normal.         Judgment: Judgment normal.         Assessment:       1. Pregnancy-related symptom, antepartum    2. Fatigue, unspecified type    3. Breast tenderness in female    4. Bloating        Plan:       Diagnoses and all orders for this visit:    Pregnancy-related symptom, antepartum    Fatigue, unspecified type  -     Pregnancy Test Screening, Serum; Future  -     Pregnancy Test Screening, Serum    Breast tenderness in female  -     Pregnancy Test Screening, Serum; Future  -     Pregnancy Test Screening, Serum    Bloating  -     Pregnancy Test Screening, Serum; Future  -     Pregnancy Test Screening, Serum    Will follow lab result.    Pt is established with OBGYN.  Follow up if symptoms worsen or fail to improve.

## 2025-04-11 ENCOUNTER — PATIENT MESSAGE (OUTPATIENT)
Dept: INTERNAL MEDICINE | Facility: CLINIC | Age: 20
End: 2025-04-11
Payer: COMMERCIAL

## 2025-04-11 LAB
ABSOLUTE EOSINOPHIL (OHS): 0.11 K/UL
ABSOLUTE MONOCYTE (OHS): 0.59 K/UL (ref 0.3–1)
ABSOLUTE NEUTROPHIL COUNT (OHS): 4.92 K/UL (ref 1.8–7.7)
ALBUMIN SERPL BCP-MCNC: 3.6 G/DL (ref 3.5–5.2)
ALP SERPL-CCNC: 85 UNIT/L (ref 40–150)
ALT SERPL W/O P-5'-P-CCNC: 22 UNIT/L (ref 10–44)
ANION GAP (OHS): 10 MMOL/L (ref 8–16)
AST SERPL-CCNC: 19 UNIT/L (ref 11–45)
BASOPHILS # BLD AUTO: 0.04 K/UL
BASOPHILS NFR BLD AUTO: 0.5 %
BILIRUB SERPL-MCNC: 0.3 MG/DL (ref 0.1–1)
BUN SERPL-MCNC: 12 MG/DL (ref 6–20)
CALCIUM SERPL-MCNC: 9.3 MG/DL (ref 8.7–10.5)
CHLORIDE SERPL-SCNC: 108 MMOL/L (ref 95–110)
CHOLEST SERPL-MCNC: 187 MG/DL (ref 120–199)
CHOLEST/HDLC SERPL: 3 {RATIO} (ref 2–5)
CO2 SERPL-SCNC: 23 MMOL/L (ref 23–29)
CREAT SERPL-MCNC: 0.7 MG/DL (ref 0.5–1.4)
ERYTHROCYTE [DISTWIDTH] IN BLOOD BY AUTOMATED COUNT: 13.2 % (ref 11.5–14.5)
GFR SERPLBLD CREATININE-BSD FMLA CKD-EPI: >60 ML/MIN/1.73/M2
GLUCOSE SERPL-MCNC: 88 MG/DL (ref 70–110)
HCT VFR BLD AUTO: 39.2 % (ref 37–48.5)
HDLC SERPL-MCNC: 62 MG/DL (ref 40–75)
HDLC SERPL: 33.2 % (ref 20–50)
HGB BLD-MCNC: 12.4 GM/DL (ref 12–16)
IMM GRANULOCYTES # BLD AUTO: 0.02 K/UL (ref 0–0.04)
IMM GRANULOCYTES NFR BLD AUTO: 0.3 % (ref 0–0.5)
LDLC SERPL CALC-MCNC: 93.4 MG/DL (ref 63–159)
LYMPHOCYTES # BLD AUTO: 2.01 K/UL (ref 1–4.8)
MCH RBC QN AUTO: 28.5 PG (ref 27–31)
MCHC RBC AUTO-ENTMCNC: 31.6 G/DL (ref 32–36)
MCV RBC AUTO: 90 FL (ref 82–98)
NONHDLC SERPL-MCNC: 125 MG/DL
NUCLEATED RBC (/100WBC) (OHS): 0 /100 WBC
PLATELET # BLD AUTO: 296 K/UL (ref 150–450)
PMV BLD AUTO: 10.4 FL (ref 9.2–12.9)
POTASSIUM SERPL-SCNC: 4.1 MMOL/L (ref 3.5–5.1)
PROT SERPL-MCNC: 6.9 GM/DL (ref 6–8.4)
RBC # BLD AUTO: 4.35 M/UL (ref 4–5.4)
RELATIVE EOSINOPHIL (OHS): 1.4 %
RELATIVE LYMPHOCYTE (OHS): 26.1 % (ref 18–48)
RELATIVE MONOCYTE (OHS): 7.7 % (ref 4–15)
RELATIVE NEUTROPHIL (OHS): 64 % (ref 38–73)
SODIUM SERPL-SCNC: 141 MMOL/L (ref 136–145)
TRIGL SERPL-MCNC: 158 MG/DL (ref 30–150)
TSH SERPL-ACNC: 2.02 UIU/ML (ref 0.4–4)
WBC # BLD AUTO: 7.69 K/UL (ref 3.9–12.7)

## 2025-04-14 ENCOUNTER — LAB VISIT (OUTPATIENT)
Dept: LAB | Facility: HOSPITAL | Age: 20
End: 2025-04-14
Payer: COMMERCIAL

## 2025-04-14 ENCOUNTER — RESULTS FOLLOW-UP (OUTPATIENT)
Dept: INTERNAL MEDICINE | Facility: CLINIC | Age: 20
End: 2025-04-14

## 2025-04-14 ENCOUNTER — TELEPHONE (OUTPATIENT)
Dept: INTERNAL MEDICINE | Facility: CLINIC | Age: 20
End: 2025-04-14
Payer: COMMERCIAL

## 2025-04-14 DIAGNOSIS — O26.90 PREGNANCY-RELATED SYMPTOM, ANTEPARTUM: ICD-10-CM

## 2025-04-14 LAB — HCG INTACT+B SERPL-ACNC: <2.4 MIU/ML

## 2025-04-14 PROCEDURE — 36415 COLL VENOUS BLD VENIPUNCTURE: CPT | Mod: PO

## 2025-04-14 PROCEDURE — 84702 CHORIONIC GONADOTROPIN TEST: CPT

## 2025-04-14 NOTE — TELEPHONE ENCOUNTER
Pt was supposed to have blood pregnancy test drawn. It was not drawn. Pt needs new order to have done today.

## 2025-04-15 ENCOUNTER — RESULTS FOLLOW-UP (OUTPATIENT)
Dept: INTERNAL MEDICINE | Facility: CLINIC | Age: 20
End: 2025-04-15

## 2025-04-21 ENCOUNTER — LAB VISIT (OUTPATIENT)
Dept: LAB | Facility: HOSPITAL | Age: 20
End: 2025-04-21
Attending: NURSE PRACTITIONER
Payer: COMMERCIAL

## 2025-04-21 ENCOUNTER — OFFICE VISIT (OUTPATIENT)
Dept: OBSTETRICS AND GYNECOLOGY | Facility: CLINIC | Age: 20
End: 2025-04-21
Payer: COMMERCIAL

## 2025-04-21 ENCOUNTER — RESULTS FOLLOW-UP (OUTPATIENT)
Dept: OBSTETRICS AND GYNECOLOGY | Facility: CLINIC | Age: 20
End: 2025-04-21

## 2025-04-21 VITALS
DIASTOLIC BLOOD PRESSURE: 72 MMHG | WEIGHT: 139.75 LBS | SYSTOLIC BLOOD PRESSURE: 114 MMHG | HEIGHT: 62 IN | BODY MASS INDEX: 25.72 KG/M2

## 2025-04-21 DIAGNOSIS — O26.90 PREGNANCY-RELATED SYMPTOM, ANTEPARTUM: Primary | ICD-10-CM

## 2025-04-21 DIAGNOSIS — O26.90 PREGNANCY-RELATED SYMPTOM, ANTEPARTUM: ICD-10-CM

## 2025-04-21 LAB — HCG INTACT+B SERPL-ACNC: <1.2 MIU/ML

## 2025-04-21 PROCEDURE — 1160F RVW MEDS BY RX/DR IN RCRD: CPT | Mod: CPTII,S$GLB,, | Performed by: NURSE PRACTITIONER

## 2025-04-21 PROCEDURE — 99999 PR PBB SHADOW E&M-EST. PATIENT-LVL III: CPT | Mod: PBBFAC,,, | Performed by: NURSE PRACTITIONER

## 2025-04-21 PROCEDURE — 3074F SYST BP LT 130 MM HG: CPT | Mod: CPTII,S$GLB,, | Performed by: NURSE PRACTITIONER

## 2025-04-21 PROCEDURE — 84702 CHORIONIC GONADOTROPIN TEST: CPT

## 2025-04-21 PROCEDURE — 3078F DIAST BP <80 MM HG: CPT | Mod: CPTII,S$GLB,, | Performed by: NURSE PRACTITIONER

## 2025-04-21 PROCEDURE — 99203 OFFICE O/P NEW LOW 30 MIN: CPT | Mod: S$GLB,,, | Performed by: NURSE PRACTITIONER

## 2025-04-21 PROCEDURE — 3008F BODY MASS INDEX DOCD: CPT | Mod: CPTII,S$GLB,, | Performed by: NURSE PRACTITIONER

## 2025-04-21 PROCEDURE — 1159F MED LIST DOCD IN RCRD: CPT | Mod: CPTII,S$GLB,, | Performed by: NURSE PRACTITIONER

## 2025-04-21 PROCEDURE — 36415 COLL VENOUS BLD VENIPUNCTURE: CPT

## 2025-04-21 NOTE — PROGRESS NOTES
"Dang Blandon is a 19 y.o. female  presents with complaint of pregnancy symptoms - weight gain, breast tenderness, - stopped her ocp due to the symptoms and home pregnancy test was positive on 25  Blood pregnancy test on 4/15/25 - negative  Remains with the symptoms     Patient's last menstrual period was 2025 (exact date).    Past Medical History:   Diagnosis Date    Asthma 2021 2:42:33 PM    [facility] Historical - Respiratory: Asthma-No Additional Notes    Asthma 2021 2:42:33 PM    [facility] Historical - Respiratory: Asthma-No Additional Notes    Non- jaundice 2021 2:42:30 PM    [facility] Historical - Quick Add: Jaundice-No Additional Notes    Non- jaundice 2021 2:42:30 PM    [facility] - Quick Add: Jaundice-No Additional Notes     Past Surgical History:   Procedure Laterality Date    ARTHROSCOPY,KNEE,WITH MENISCUS REPAIR Left 3/18/2025    Procedure: Left knee arthroscopy, possible parameniscal cyst excision, open reduction internal fixation of tibial insufficiency fracture with Ossiofiber, lateral meniscus repair;  Surgeon: Yovanny Matthews MD;  Location: [facility];  Service: Orthopedics;  Laterality: Left;    TONSILLECTOMY       Social History[1]  No family history on file.  OB History    Para Term  AB Living   0 0 0 0 0 0   SAB IAB Ectopic Multiple Live Births   0 0 0 0 0       /72 (BP Location: Left arm, Patient Position: Sitting)   Ht 5' 2" (1.575 m)   Wt 63.4 kg (139 lb 12.4 oz)   LMP 2025 (Exact Date)   BMI 25.56 kg/m²     ROS:  Per hpi    PHYSICAL EXAM:  examination not indicated  Physical Exam     ASSESSMENT and PLAN:  1. Pregnancy-related symptom, antepartum  HCG, Quantitative          Diagnoses and all orders for this visit:    Pregnancy-related symptom, antepartum  -     HCG, Quantitative; Future         Check hcg today  Pending results on hcg will determine follow up      Answers submitted by " the patient for this visit:  Gynecologic Exam Questionnaire  (Submitted on 2025)  Chief Complaint: Gynecologic exam  genital itching: No  genital lesions: No  genital odor: No  genital rash: No  missed menses: No  pelvic pain: No  vaginal bleeding: No  vaginal discharge: No  Chronicity: new  Onset: 1 to 4 weeks ago  Frequency: daily  Progression since onset: unchanged  Pain severity: mild  Pregnant now?: No  abdominal pain: Yes  anorexia: No  back pain: Yes  chills: No  constipation: No  diarrhea: No  discolored urine: No  dysuria: No  fever: No  flank pain: No  frequency: Yes  headaches: No  hematuria: No  nausea: Yes  painful intercourse: No  rash: No  urgency: Yes  vomiting: No  Please select the characteristics of your discharge: : normal  Vaginal bleeding: no bleeding  Passing clots?: No  Passing tissue?: No  Aggravated by: nothing  treatments tried: nothing  Improvement on treatment: no relief  Sexual activity: sexually active  Partner with STD symptoms: no  Birth control: oral contraceptives  Menstrual history: regular  STD: No  abdominal surgery: No   section: No  Ectopic pregnancy: No  Endometriosis: No  herpes simplex: No  gynecological surgery: No  menorrhagia: Yes  metrorrhagia: No  miscarriage: No  ovarian cysts: No  perineal abscess: No  PID: No  terminated pregnancy: No  vaginosis: No         [1]   Social History  Tobacco Use    Smoking status: Never     Passive exposure: Never    Smokeless tobacco: Never   Substance Use Topics    Alcohol use: Never    Drug use: Never

## 2025-04-29 ENCOUNTER — TELEPHONE (OUTPATIENT)
Dept: SPORTS MEDICINE | Facility: CLINIC | Age: 20
End: 2025-04-29
Payer: COMMERCIAL

## 2025-04-30 ENCOUNTER — HOSPITAL ENCOUNTER (OUTPATIENT)
Dept: RADIOLOGY | Facility: HOSPITAL | Age: 20
Discharge: HOME | End: 2025-04-30
Attending: PHYSICIAN ASSISTANT
Payer: COMMERCIAL

## 2025-04-30 ENCOUNTER — OFFICE VISIT (OUTPATIENT)
Dept: SPORTS MEDICINE | Facility: CLINIC | Age: 20
End: 2025-04-30
Payer: COMMERCIAL

## 2025-04-30 DIAGNOSIS — G89.29 CHRONIC PATELLOFEMORAL PAIN OF LEFT KNEE: Primary | ICD-10-CM

## 2025-04-30 DIAGNOSIS — Z98.890 S/P LATERAL MENISCUS REPAIR OF LEFT KNEE: ICD-10-CM

## 2025-04-30 DIAGNOSIS — M84.469D INSUFFICIENCY FRACTURE OF TIBIA WITH ROUTINE HEALING, SUBSEQUENT ENCOUNTER: ICD-10-CM

## 2025-04-30 DIAGNOSIS — Z98.890 S/P ARTHROSCOPIC SURGERY OF LEFT KNEE: ICD-10-CM

## 2025-04-30 DIAGNOSIS — M25.562 CHRONIC PATELLOFEMORAL PAIN OF LEFT KNEE: ICD-10-CM

## 2025-04-30 DIAGNOSIS — G89.29 CHRONIC PATELLOFEMORAL PAIN OF LEFT KNEE: ICD-10-CM

## 2025-04-30 DIAGNOSIS — M25.562 CHRONIC PATELLOFEMORAL PAIN OF LEFT KNEE: Primary | ICD-10-CM

## 2025-04-30 PROCEDURE — 99999 PR PBB SHADOW E&M-EST. PATIENT-LVL II: CPT | Mod: PBBFAC,,, | Performed by: PHYSICIAN ASSISTANT

## 2025-04-30 PROCEDURE — 73560 X-RAY EXAM OF KNEE 1 OR 2: CPT | Mod: 26,LT,, | Performed by: RADIOLOGY

## 2025-04-30 PROCEDURE — 73560 X-RAY EXAM OF KNEE 1 OR 2: CPT | Mod: TC,FY,PO,LT

## 2025-04-30 PROCEDURE — 99024 POSTOP FOLLOW-UP VISIT: CPT | Mod: S$GLB,,, | Performed by: PHYSICIAN ASSISTANT

## 2025-04-30 NOTE — PATIENT INSTRUCTIONS
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    Chronic patellofemoral pain of left knee Yes    Insufficiency fracture of tibia with routine healing, subsequent encounter     S/P arthroscopic surgery of left knee     S/P lateral meniscus repair of left knee         Plan:  PT[facility]OT:   [facility]   Education:    Post-op recovery and timeline    Return to clinic:    6 weeks with Dr. Matthews    Follow-up: 6 weeks with Dr. Matthews. Please reach out to us sooner if there are any problems between now and then.    About Dr. Cassie Matthews's Research & Publications    Give us Feedback:   Google: Leave Google Review  Healthgrades: Leave Healthgrades Review

## 2025-04-30 NOTE — PROGRESS NOTES
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 She presents FWB/WBAT: left lower extremity with no brace/assistive devices. The patient has started physical therapy at [facility] in [facility]. Overall there are no issues or concerns.     Past Medical History:   Past Medical History:   Diagnosis Date    Asthma 2021 2:42:33 PM    [facility] Historical - Respiratory: Asthma-No Additional Notes    Asthma 2021 2:42:33 PM    [facility] Historical - Respiratory: Asthma-No Additional Notes    Non- jaundice 2021 2:42:30 PM    [facility] Historical - Quick Add: Jaundice-No Additional Notes    Non- jaundice 2021 2:42:30 PM    [facility] Historical - Quick Add: Jaundice-No Additional Notes     Past Surgical History:   Procedure Laterality Date    ARTHROSCOPY,KNEE,WITH MENISCUS REPAIR Left 3/18/2025    Procedure: Left knee arthroscopy, possible parameniscal cyst excision, open reduction internal fixation of tibial insufficiency fracture with Ossiofiber, lateral meniscus repair;  Surgeon: Yovanny Matthews MD;  Location: [facility];  Service: Orthopedics;  Laterality: Left;    TONSILLECTOMY       No family history on file.  Social History[1]  Medication List with Changes/Refills   Current Medications    BUSPIRONE (BUSPAR) 10 MG TABLET    Take 1 tablet (10 mg total) by mouth 3 (three) times daily as needed (anxiety).    NORETHINDRONE-E.ESTRADIOL-IRON (LO LOESTRIN FE)  1 MG-10 MCG (24)/10 MCG (2) TAB    Take 1 tablet by mouth once daily.    SERTRALINE (ZOLOFT) 100 MG TABLET    1.5 tablets daily by mouth     Review of patient's allergies indicates:  No Known Allergies  ROS    Physical Exam:   There is no height or weight on file to calculate BMI.  There were no vitals filed for this visit.   GENERAL: Well appearing, appropriate for stated age, no acute distress.  CARDIOVASCULAR: Pulses regular by peripheral palpation.  PULMONARY: Respirations are even and non-labored.  NEURO: Awake, alert, and oriented x 3.  PSYCH: Mood & affect are appropriate.  HEENT: Head is normocephalic and atraumatic.    Ortho/SPM Exam  Left Knee Exam  No signs of infection  Knee ROM full extension to 140 degrees flexion   All compartments are soft and compressible. Calf soft non-tender. Intact EHL, FHL, gastrocsoleus, and tibialis anterior. Sensation intact to light touch in superficial peroneal, deep peroneal, tibial, sural, and saphenous nerve distributions. Foot warm and well perfused with capillary refill of less than 2 seconds and palpable pedal pulses.       Imaging:   XR Results:  Results for orders placed during the hospital encounter of 04/30/25    X-Ray Knee 1 or 2 View Left    Narrative  EXAM:  XR KNEE 1 OR 2 VIEW LEFT    CLINICAL HISTORY:    Left knee joint pain    TECHNIQUE: 2 views of the left knee.    COMPARISON: None.    FINDINGS:    Bone density and architecture are normal. No acute findings.    Impression  Negative study    Finalized on: 4/30/2025 10:49 AM By:  Keith Haynes MD  [facility]# [id number]      2025-04-30 10:51:12.669     [facility]          Relevant imaging results reviewed and interpreted by me, discussed with the patient and / or family today.      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    Chronic patellofemoral pain of left knee Yes    Insufficiency fracture of tibia with routine healing, subsequent encounter     S/P arthroscopic surgery of left knee     S/P lateral meniscus repair of left knee         Plan:  PT/OT:   Continue physical therapy with Adarsh Rivas   Education:    Post-op recovery and timeline    Return to clinic:    6 weeks with Dr. Matthews    Follow-up: 6 weeks with Dr. Matthews. Please reach out to us sooner if there are any problems between now and then.    About Dr. Cassie Matthews's Research & Publications    Give us Feedback:   Google: Leave Google Review  Healthgrades: Leave Healthgrades Review     Provider Note/Medical Decision Making:       I discussed worrisome and red flag signs and symptoms with the patient. The patient expressed understanding and agreed to alert me immediately or to go to the emergency room if they experience any of these.   Treatment plan was developed with input from the patient/family, and they expressed understanding and agreement with the plan. All questions were answered today.      Kourtney Peralta PA-C  Sports Medicine Physician Assistant       Disclaimer: This note was prepared using a voice recognition system and is likely to have sound alike errors within the text.          [1]   Social History  Socioeconomic History    Marital status: Single   Tobacco Use    Smoking status: Never     Passive exposure: Never    Smokeless tobacco: Never   Substance and Sexual Activity    Alcohol use: Never    Drug use: Never    Sexual activity: Yes     Partners: Male     Social Drivers of Health     Financial Resource Strain: Low Risk  (3/10/2025)    Overall Financial Resource Strain (CARDI)     Difficulty of Paying Living  Expenses: Not very hard   Food Insecurity: No Food Insecurity (3/10/2025)    Hunger Vital Sign     Worried About Running Out of Food in the Last Year: Never true     Ran Out of Food in the Last Year: Never true   Transportation Needs: No Transportation Needs (3/10/2025)    PRAPARE - Transportation     Lack of Transportation (Medical): No     Lack of Transportation (Non-Medical): No   Physical Activity: Inactive (3/10/2025)    Exercise Vital Sign     Days of Exercise per Week: 0 days     Minutes of Exercise per Session: 0 min   Stress: No Stress Concern Present (3/10/2025)    [employer] - Occupational Stress Questionnaire     Feeling of Stress : Only a little   Housing Stability: Low Risk  (3/10/2025)    Housing Stability Vital Sign     Unable to Pay for Housing in the Last Year: No     Number of Times Moved in the Last Year: 0     Homeless in the Last Year: No

## 2025-05-06 ENCOUNTER — OFFICE VISIT (OUTPATIENT)
Dept: INTERNAL MEDICINE | Facility: CLINIC | Age: 20
End: 2025-05-06
Payer: COMMERCIAL

## 2025-05-06 VITALS
HEART RATE: 107 BPM | OXYGEN SATURATION: 98 % | DIASTOLIC BLOOD PRESSURE: 68 MMHG | WEIGHT: 138.88 LBS | TEMPERATURE: 98 F | SYSTOLIC BLOOD PRESSURE: 114 MMHG | HEIGHT: 62 IN | BODY MASS INDEX: 25.55 KG/M2 | RESPIRATION RATE: 16 BRPM

## 2025-05-06 DIAGNOSIS — R61 UNEXPLAINED NIGHT SWEATS: ICD-10-CM

## 2025-05-06 DIAGNOSIS — F33.1 MODERATE EPISODE OF RECURRENT MAJOR DEPRESSIVE DISORDER: ICD-10-CM

## 2025-05-06 DIAGNOSIS — Z00.00 ROUTINE GENERAL MEDICAL EXAMINATION AT A HEALTH CARE FACILITY: Primary | ICD-10-CM

## 2025-05-06 DIAGNOSIS — N92.6 MISSED MENSES: ICD-10-CM

## 2025-05-06 DIAGNOSIS — O26.90 PREGNANCY-RELATED SYMPTOM, ANTEPARTUM: ICD-10-CM

## 2025-05-06 DIAGNOSIS — F41.1 GAD (GENERALIZED ANXIETY DISORDER): ICD-10-CM

## 2025-05-06 LAB
B-HCG UR QL: NEGATIVE
CTP QC/QA: YES

## 2025-05-06 PROCEDURE — 99999 PR PBB SHADOW E&M-EST. PATIENT-LVL IV: CPT | Mod: PBBFAC,,,

## 2025-05-06 NOTE — PROGRESS NOTES
Subjective:       Patient ID: Dang Dia Head is a 19 y.o. female.    Chief Complaint: Annual Exam    19-year-old female presents for annual exam.  Pt accompanied by her mother.    LMP 3/31/2025; menses are irregular, cycles last 6 days-OBGYN aware of irregular cycles, Hcg negative x2, will do UPT today in clinic                                                                                                                                                              Currently sexually active, no concerns for STIs                                                                                                                                                                                                                                                   Denies breast lumps, bumps, nipple discharge, change in contour                                                                               Denies increased urinary urgency, endorses frequency                                                                                                                                        Denies diarrhea, constipation                                                                                                                                                                                Last eye exam over a year ago, no corrective lenses  Last dental exam over 6mo ago, no dental disease, brushes teeth BID  Wears her seatbelt in the car  Does not eat a well balanced diet  Sleeps well most nights  No formal exercise                                                                                                                                                  Works full time as a pharmacy tech  Denies chest pain, palpitations, dyspnea, edema, changes in vision, flashes, floaters, tinnitus, myalgia, joint pain, numbness and tingling, weakness, syncope.  Denies recent stress, feelings of anxiety and depression-on zoloft and  "buspar with good effect    GYNECOLOGIC:  Her last menstrual period was on March 31, typically lasting 6 days. She reports irregular menstrual cycles and has consulted with her OBGYN. She has taken multiple home pregnancy tests with conflicting results (three negative, two positive). She has had 2 negative Hgb tests. She reports breast tenderness, fatigue, bloating, urinary frequency,  and hot flashes. She has re-started birth control pills.    CONSTITUTIONAL:  She experiences drenching night sweats occurring nightly.    MEDICATIONS:  She continues Buspar and Zoloft for anxiety and depression with reported effectiveness.    SURGICAL HISTORY:  History of ear tubes placement in childhood and tonsillectomy.        /68 (BP Location: Left arm, Patient Position: Sitting)   Pulse 107   Temp 98 °F (36.7 °C) (Tympanic)   Resp 16   Ht 5' 2" (1.575 m)   Wt 63 kg (138 lb 14.2 oz)   LMP 03/31/2025 (Exact Date)   SpO2 98%   BMI 25.40 kg/m²     Review of Systems   Constitutional:  Positive for fatigue. Negative for chills and fever.   Eyes:  Negative for visual disturbance.   Respiratory:  Negative for chest tightness and shortness of breath.    Cardiovascular:  Negative for chest pain, palpitations and leg swelling.   Gastrointestinal:  Negative for constipation, diarrhea, nausea and vomiting.   Genitourinary:  Positive for frequency. Negative for difficulty urinating.   Neurological:  Negative for headaches.   All other systems reviewed and are negative.      Objective:      Physical Exam  Constitutional:       General: She is not in acute distress.     Appearance: Normal appearance. She is not ill-appearing or toxic-appearing.   HENT:      Head: Normocephalic and atraumatic.      Right Ear: Tympanic membrane, ear canal and external ear normal.      Left Ear: Tympanic membrane, ear canal and external ear normal.      Nose: Nose normal.      Mouth/Throat:      Mouth: Mucous membranes are moist.      Pharynx: " Oropharynx is clear.   Eyes:      Extraocular Movements: Extraocular movements intact.      Conjunctiva/sclera: Conjunctivae normal.      Pupils: Pupils are equal, round, and reactive to light.   Cardiovascular:      Rate and Rhythm: Normal rate and regular rhythm.      Pulses:           Radial pulses are 2+ on the right side and 2+ on the left side.   Pulmonary:      Effort: Pulmonary effort is normal.      Breath sounds: Normal breath sounds.   Abdominal:      General: Bowel sounds are normal.      Palpations: Abdomen is soft.   Musculoskeletal:         General: Normal range of motion.      Cervical back: Normal range of motion and neck supple.   Skin:     General: Skin is warm and dry.   Neurological:      General: No focal deficit present.      Mental Status: She is alert and oriented to person, place, and time.   Psychiatric:         Mood and Affect: Mood normal.         Behavior: Behavior normal.         Thought Content: Thought content normal.         Judgment: Judgment normal.         Assessment:       1. Routine general medical examination at a health care facility    2. Missed menses    3. Unexplained night sweats    4. Pregnancy-related symptom, antepartum    5. LUCINDA (generalized anxiety disorder)    6. Moderate episode of recurrent major depressive disorder     UPT negative today in clinic  Plan:       IMPRESSION:  - Considered hormonal causes for reported night sweats.  - Evaluated irregular menstrual cycles; OBGYN aware of issue.  - Assessed for pregnancy due to irregular cycles and previous symptoms, despite negative labs.    Routine general medical examination at a health care facility    Missed menses  -     POCT Urine Pregnancy  - Patient has irregular menstrual cycles, with last period on 3/31, and is currently overdue.  - Patient's OBGYN is aware of the irregular cycles.  - Advised patient to continue taking birth control pills.    Unexplained night sweats  - Patient experiences drenching night  sweats nightly, possibly hormonal in nature.  - Follow up with OBGYN if persists    Pregnancy-related symptom, antepartum  -     POCT Urine Pregnancy    LUCINDA (generalized anxiety disorder)  Mood stable on Zoloft daily and Buspar prn  - Continued current prescription of Buspar for anxiety management, which patient uses as needed.    Moderate episode of recurrent major depressive disorder  Mood stable on Zoloft daily and Buspar prn  - Continued current prescription of Zoloft for depression management.    Discussed age appropriate anticipatory guidance, healthy diet and lifestyle, regular exercise, weight management.  Updated hm  Reviewed recent lab work, essentially unremarkable  Follow up annually and prn     Follow up in about 1 year (around 5/6/2026), or if symptoms worsen or fail to improve.

## 2025-05-16 DIAGNOSIS — F41.9 ANXIETY AND DEPRESSION: Chronic | ICD-10-CM

## 2025-05-16 DIAGNOSIS — F32.A ANXIETY AND DEPRESSION: Chronic | ICD-10-CM

## 2025-05-16 RX ORDER — SERTRALINE HYDROCHLORIDE 100 MG/1
TABLET, FILM COATED ORAL
Qty: 135 TABLET | Refills: 0 | Status: SHIPPED | OUTPATIENT
Start: 2025-05-16

## 2025-05-16 NOTE — TELEPHONE ENCOUNTER
Provider Staff:  Action required for this patient    Requires appointment      Please see care gap opportunities below in Care Due Message.    Thanks!  [facility]     Appointments      Date Provider   Last Visit   8/15/2024 Donna Medina MD   Next Visit   Visit date not found Donna Medina MD     Refill Decision Note   Dang Head  is requesting a refill authorization.    Brief Assessment and Rationale for Refill:  Approve       Medication Therapy Plan:         Comments:     Note composed:10:16 AM 05/16/2025

## 2025-05-16 NOTE — TELEPHONE ENCOUNTER
Care Due:                  Date            Visit Type   Department     Provider  --------------------------------------------------------------------------------                                EP -                              PRIMARY      [facility]  Donna  Last Visit: 08-      CARE ([facility])   MEDICINE       Paige-Pedescleaux  Next Visit: None Scheduled  None         None Found                                                            Last  Test          Frequency    Reason                     Performed    Due Date  --------------------------------------------------------------------------------    Office Visit  12 months..  busPIRone................  08-   08-    [facility] Embedded Care Due Messages. Reference number: [id number].   5/16/2025 8:19:10 AM CDT

## 2025-05-28 DIAGNOSIS — F41.9 ANXIETY AND DEPRESSION: Chronic | ICD-10-CM

## 2025-05-28 DIAGNOSIS — F32.A ANXIETY AND DEPRESSION: Chronic | ICD-10-CM

## 2025-05-28 RX ORDER — BUSPIRONE HYDROCHLORIDE 10 MG/1
TABLET ORAL
Qty: 30 TABLET | Refills: 3 | Status: SHIPPED | OUTPATIENT
Start: 2025-05-28

## 2025-05-28 NOTE — TELEPHONE ENCOUNTER
No care due was identified.  [facility] Embedded Care Due Messages. Reference number: [id number].   5/28/2025 8:19:21 AM CDT

## 2025-06-18 ENCOUNTER — OFFICE VISIT (OUTPATIENT)
Dept: SPORTS MEDICINE | Facility: CLINIC | Age: 20
End: 2025-06-18
Payer: COMMERCIAL

## 2025-06-18 ENCOUNTER — HOSPITAL ENCOUNTER (OUTPATIENT)
Dept: RADIOLOGY | Facility: HOSPITAL | Age: 20
Discharge: HOME | End: 2025-06-18
Attending: ORTHOPAEDIC SURGERY
Payer: COMMERCIAL

## 2025-06-18 VITALS — HEIGHT: 62 IN | WEIGHT: 138.88 LBS | BODY MASS INDEX: 25.55 KG/M2

## 2025-06-18 DIAGNOSIS — M25.562 CHRONIC PATELLOFEMORAL PAIN OF LEFT KNEE: Primary | ICD-10-CM

## 2025-06-18 DIAGNOSIS — M23.042 CYST OF ANTERIOR HORN OF LATERAL MENISCUS OF LEFT KNEE: ICD-10-CM

## 2025-06-18 DIAGNOSIS — G89.29 CHRONIC PATELLOFEMORAL PAIN OF LEFT KNEE: ICD-10-CM

## 2025-06-18 DIAGNOSIS — M84.469D INSUFFICIENCY FRACTURE OF TIBIA WITH ROUTINE HEALING, SUBSEQUENT ENCOUNTER: ICD-10-CM

## 2025-06-18 DIAGNOSIS — Z98.890 S/P LATERAL MENISCUS REPAIR OF LEFT KNEE: ICD-10-CM

## 2025-06-18 DIAGNOSIS — M25.562 CHRONIC PATELLOFEMORAL PAIN OF LEFT KNEE: ICD-10-CM

## 2025-06-18 DIAGNOSIS — Z98.890 S/P ARTHROSCOPIC SURGERY OF LEFT KNEE: ICD-10-CM

## 2025-06-18 DIAGNOSIS — G89.29 CHRONIC PATELLOFEMORAL PAIN OF LEFT KNEE: Primary | ICD-10-CM

## 2025-06-18 PROCEDURE — 99213 OFFICE O/P EST LOW 20 MIN: CPT | Mod: S$GLB,,, | Performed by: ORTHOPAEDIC SURGERY

## 2025-06-18 PROCEDURE — 3008F BODY MASS INDEX DOCD: CPT | Mod: CPTII,S$GLB,, | Performed by: ORTHOPAEDIC SURGERY

## 2025-06-18 PROCEDURE — 73562 X-RAY EXAM OF KNEE 3: CPT | Mod: TC,PN,RT

## 2025-06-18 PROCEDURE — 1159F MED LIST DOCD IN RCRD: CPT | Mod: CPTII,S$GLB,, | Performed by: ORTHOPAEDIC SURGERY

## 2025-06-18 PROCEDURE — 99999 PR PBB SHADOW E&M-EST. PATIENT-LVL III: CPT | Mod: PBBFAC,,, | Performed by: ORTHOPAEDIC SURGERY

## 2025-06-18 PROCEDURE — 97110 THERAPEUTIC EXERCISES: CPT | Mod: S$GLB,,, | Performed by: ORTHOPAEDIC SURGERY

## 2025-06-18 PROCEDURE — 73560 X-RAY EXAM OF KNEE 1 OR 2: CPT | Mod: 26,LT,, | Performed by: STUDENT IN AN ORGANIZED HEALTH CARE EDUCATION/TRAINING PROGRAM

## 2025-06-18 NOTE — PROGRESS NOTES
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    Onset: Insidious  Inciting event and date: 3 months s/p Left knee arthroscopically guided fixation of tibial insufficiency fracture, Left knee athroscopic repair of lateral meniscus tear, and Left knee athroscopic excision of parameniscal cyst through elongated incision on 3/18/2025   Physical therapy focused on specific complaint (frequency and duration): 1x a week with [facility] in [place] since surgery currently, previous was 2x a week  Injections:Before Surgery, June 2024     History of Present Illness    CHIEF COMPLAINT:  - Dang presents for 3-month post-op follow-up, reporting ongoing knee pain.    HPI:  Dang presents for follow-up approximately three months post-knee surgery. She reports ongoing knee pain, primarily localized to the area of the surgical incision. This pain differs from her pre-surgical pain, which she notes as a positive development. She estimates a 65% improvement since the surgery and mentions some numbness around the incision site. Currently attending PT once a week, reduced from twice weekly. Therapy includes exercises such as lateral movements and leg lifts with resistance bands. She denies using any topical creams for pain management, feeling worse, or having no improvement since the surgery. PT: Initially twice weekly, currently once a week. Exercises include lateral movements and hip exercises with resistance bands.    SURGICAL HISTORY:  - Knee surgery: 3 months ago      ROS:  Musculoskeletal: +joint pain         Past Medical History:   Past Medical History:   Diagnosis Date    Asthma 2/9/2021 2:42:33 PM    [facility] Historical -  Respiratory: Asthma-No Additional Notes    Asthma 2021 2:42:33 PM    [facility] Historical - Respiratory: Asthma-No Additional Notes    Non- jaundice 2021 2:42:30 PM    [facility] Historical - Quick Add: Jaundice-No Additional Notes    Non- jaundice 2021 2:42:30 PM    [facility] Historical - Quick Add: Jaundice-No Additional Notes     Past Surgical History:   Procedure Laterality Date    ARTHROSCOPY,KNEE,WITH MENISCUS REPAIR Left 3/18/2025    Procedure: Left knee arthroscopy, possible parameniscal cyst excision, open reduction internal fixation of tibial insufficiency fracture with Ossiofiber, lateral meniscus repair;  Surgeon: Yovanny Matthews MD;  Location: [facility];  Service: Orthopedics;  Laterality: Left;    TONSILLECTOMY       No family history on file.  Social History[1]  Medication List with Changes/Refills   Current Medications    BUSPIRONE (BUSPAR) 10 MG TABLET    TAKE ONE (1) TABLET (10 MG TOTAL) BY MOUTH 3 (THREE) TIMES DAILY as NEEDED (ANXIETY).    NORETHINDRONE-E.ESTRADIOL-IRON (LO LOESTRIN FE) 1 MG-10 MCG (24)/10 MCG (2) TAB    Take 1 tablet by mouth once daily.    SERTRALINE (ZOLOFT) 100 MG TABLET    TAKE ONE AND A HALF (1 & 1/2) TABLETS BY MOUTH EVERY DAY     Review of patient's allergies indicates:  No Known Allergies  ROS    Physical Exam:   Body mass index is 25.4 kg/m².  There were no vitals filed for this visit.   GENERAL: Well appearing, appropriate for stated age, no acute distress.  CARDIOVASCULAR: Pulses regular by peripheral palpation.  PULMONARY: Respirations are even and non-labored.  NEURO: Awake, alert, and oriented x 3.  PSYCH: Mood & affect are appropriate.  HEENT: Head is normocephalic and atraumatic.              Left Knee Exam     Inspection   Effusion: absent    Tenderness   Left knee tenderness location: lateral portal scar.    Range of Motion   The patient has normal left knee ROM.  Extension:  0   Flexion:  120     Tests   Meniscus    Talat:  Medial - negative Lateral - negative  Stability   Lachman: normal (-1 to 2mm)   MCL - Valgus: normal (0 to 2mm)  LCL - Varus: normal (0 to 2mm)  Patella   J-Sign: J sign present    Other   Sensation: normal    Comments:  No tenderness from previous sites before surgery along tibia    Intact EHL, FHL, gastrocsoleus, and tibialis anterior. Sensation intact to light touch in superficial peroneal, deep peroneal, tibial, sural, and saphenous nerve distributions. Foot warm and well perfused with capillary refill of less than 2 seconds and palpable pedal pulses.      Muscle Strength   Left Lower Extremity   Hip Abduction: 4/5   Quadriceps:  4/5   Hamstrin/5     Vascular Exam       Left Pulses  Dorsalis Pedis:      2+  Posterior Tibial:      2+        Physical Exam    IMAGING:  - XR Knee: Results pending review.           Imaging:    X-ray Knee Ortho Left with Flexion (XPD)  Narrative: EXAMINATION:  XR KNEE ORTHO LEFT WITH FLEXION (XPD)    CLINICAL HISTORY:  Pain in left knee    TECHNIQUE:  XR KNEE ORTHO LEFT WITH FLEXION (XPD)    COMPARISON:  2020.    FINDINGS:  No evidence of acute fracture or dislocation.  Relative joint space preservation bilaterally.  Small joint effusion on the left.  Impression: As above.    Electronically signed by: Aidan Perea  Date:    2025  Time:    08:47      Relevant imaging results reviewed and interpreted by me, discussed with the patient and / or family today.     Other Tests:     Assessment & Plan    PLAN SUMMARY:   XR ordered   Prescribed compounded cream for inflammation; apply 2-3 times daily when flared   Perform exercises 15-20 minutes daily on non-therapy days   Follow up with PA in 2 months    RIGHT KNEE PAIN:   Prescribed a compounded cream for decreasing inflammation in the affected area.   Apply compounded cream 2-3 times daily when area is flared up.   Ordered XR.    MUSCLE WEAKNESS:   Perform exercises 15-20 minutes daily on non-therapy  days.    FOLLOW-UP:   Follow up in 2 months with PA.         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    Chronic patellofemoral pain of left knee Yes    Insufficiency fracture of tibia with routine healing, subsequent encounter     S/P arthroscopic surgery of left knee     S/P lateral meniscus repair of left knee     Cyst of anterior horn of lateral meniscus of left knee         Plan:  Prescribe Compound cream #2. Apply Cream to the affected area twice daily.  Can use voltaren cream until compound cream on area.   Given home exercise program to work on hip and glute strength   Xrays on the left knee on the way out  Discussed possible MRI of the right knee if no improvement with similar treatment to left knee    Follow-up: 2 months (with Thanh Guaman). Please reach out to us sooner if there are any problems between now and then.    About Dr. Cassie Matthews's Research & Publications    Give us Feedback:   Google: Leave Google Review  Healthgrades: Leave Healthgrades Review                     Provider Note/Medical Decision Making:  At least 15 minutes were spent developing, teaching, and performing a home exercise program.  A written summary was provided and all questions were answered. This service was performed by Dr. Yovanny Matthews and his assistant under his direction. CPT 25834-ZX    I discussed worrisome and red flag signs and symptoms with the patient. The patient expressed understanding and agreed to alert me immediately or to go to the emergency room if they  experience any of these. Treatment plan was developed with input from the patient/family, and they expressed understanding and agreement with the plan. All questions were answered today.          Yovanny Matthews MD  Board Certified in Orthopaedic Surgery & Sports Medicine   Regional Section Head of Orthopedic Surgery & Sports Medicine  [facility]      Disclaimer: This note was prepared using a voice recognition system and is likely to have sound alike errors within the text.     This note was generated with the assistance of ambient listening technology. Verbal consent was obtained by the patient and accompanying visitor(s) for the recording of patient appointment to facilitate this note. I attest to having reviewed and edited the generated note for accuracy, though some syntax or spelling errors may persist. Please contact the author of this note for any clarification.    I, Ludmila Ton, acted as a scribe for Yovanny Matthews MD for the duration of this office visit.         [1]   Social History  Socioeconomic History    Marital status: Single   Tobacco Use    Smoking status: Never     Passive exposure: Never    Smokeless tobacco: Never   Substance and Sexual Activity    Alcohol use: Never    Drug use: Never    Sexual activity: Yes     Partners: Male     Social Drivers of Health     Financial Resource Strain: Low Risk  (3/10/2025)    Overall Financial Resource Strain (CARDIA)     Difficulty of Paying Living Expenses: Not very hard   Food Insecurity: No Food Insecurity (3/10/2025)    Hunger Vital Sign     Worried About Running Out of Food in the Last Year: Never true     Ran Out of Food in the Last Year: Never true   Transportation Needs: No Transportation Needs (3/10/2025)    PRAPARE - Transportation     Lack of Transportation (Medical): No     Lack of Transportation (Non-Medical): No   Physical Activity: Inactive (3/10/2025)    Exercise  Vital Sign     Days of Exercise per Week: 0 days     Minutes of Exercise per Session: 0 min   Stress: No Stress Concern Present (3/10/2025)    [employer] - Occupational Stress Questionnaire     Feeling of Stress : Only a little   Housing Stability: Low Risk  (3/10/2025)    Housing Stability Vital Sign     Unable to Pay for Housing in the Last Year: No     Number of Times Moved in the Last Year: 0     Homeless in the Last Year: No

## 2025-06-18 NOTE — PATIENT INSTRUCTIONS
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    Chronic patellofemoral pain of left knee Yes    Insufficiency fracture of tibia with routine healing, subsequent encounter     S/P arthroscopic surgery of left knee     S/P lateral meniscus repair of left knee     Cyst of anterior horn of lateral meniscus of left knee         Plan:  Prescribe Compound cream #2. Apply Cream to the affected area twice daily.  Can use voltaren cream until compound cream on area.   Given home exercise program to work on hip and glute strength   Xrays on the left knee on the way out  Discussed possible MRI of the right knee if no improvement with similar treatment to left knee    Follow-up: 2 months (with Thanh Guaman). Please reach out to us sooner if there are any problems between now and then.    About Dr. Cassie Matthews's Research & Publications    Give us Feedback:   Google: Leave Google Review  Healthgrades: Leave Healthgrades Review

## 2025-06-19 ENCOUNTER — PATIENT MESSAGE (OUTPATIENT)
Dept: SPORTS MEDICINE | Facility: CLINIC | Age: 20
End: 2025-06-19
Payer: COMMERCIAL

## 2025-07-16 ENCOUNTER — PATIENT MESSAGE (OUTPATIENT)
Dept: SPORTS MEDICINE | Facility: CLINIC | Age: 20
End: 2025-07-16
Payer: COMMERCIAL

## 2025-08-12 DIAGNOSIS — F32.A ANXIETY AND DEPRESSION: Chronic | ICD-10-CM

## 2025-08-12 DIAGNOSIS — F41.9 ANXIETY AND DEPRESSION: Chronic | ICD-10-CM

## 2025-08-12 RX ORDER — SERTRALINE HYDROCHLORIDE 100 MG/1
TABLET, FILM COATED ORAL
Qty: 135 TABLET | Refills: 0 | Status: SHIPPED | OUTPATIENT
Start: 2025-08-12

## 2025-08-21 ENCOUNTER — OFFICE VISIT (OUTPATIENT)
Dept: SPORTS MEDICINE | Facility: CLINIC | Age: 20
End: 2025-08-21
Payer: COMMERCIAL

## 2025-08-21 DIAGNOSIS — Z98.890 S/P LATERAL MENISCUS REPAIR OF LEFT KNEE: ICD-10-CM

## 2025-08-21 DIAGNOSIS — M23.91 INTERNAL DERANGEMENT OF RIGHT KNEE: ICD-10-CM

## 2025-08-21 DIAGNOSIS — M84.469D INSUFFICIENCY FRACTURE OF TIBIA WITH ROUTINE HEALING, SUBSEQUENT ENCOUNTER: ICD-10-CM

## 2025-08-21 DIAGNOSIS — G89.29 CHRONIC PATELLOFEMORAL PAIN OF LEFT KNEE: Primary | ICD-10-CM

## 2025-08-21 DIAGNOSIS — Z98.890 S/P ARTHROSCOPIC SURGERY OF LEFT KNEE: ICD-10-CM

## 2025-08-21 DIAGNOSIS — M23.042 CYST OF ANTERIOR HORN OF LATERAL MENISCUS OF LEFT KNEE: ICD-10-CM

## 2025-08-21 DIAGNOSIS — M25.562 CHRONIC PATELLOFEMORAL PAIN OF LEFT KNEE: Primary | ICD-10-CM

## 2025-08-21 PROCEDURE — 1160F RVW MEDS BY RX/DR IN RCRD: CPT | Mod: CPTII,95,, | Performed by: PHYSICIAN ASSISTANT

## 2025-08-21 PROCEDURE — 98000 SYNCH AUDIO-VIDEO NEW SF 15: CPT | Mod: 95,,, | Performed by: PHYSICIAN ASSISTANT

## 2025-08-21 PROCEDURE — 1159F MED LIST DOCD IN RCRD: CPT | Mod: CPTII,95,, | Performed by: PHYSICIAN ASSISTANT

## 2025-08-27 ENCOUNTER — HOSPITAL ENCOUNTER (OUTPATIENT)
Dept: RADIOLOGY | Facility: HOSPITAL | Age: 20
Discharge: HOME | End: 2025-08-27
Attending: PHYSICIAN ASSISTANT
Payer: COMMERCIAL

## 2025-08-27 DIAGNOSIS — M23.91 INTERNAL DERANGEMENT OF RIGHT KNEE: ICD-10-CM

## 2025-08-27 PROCEDURE — 73721 MRI JNT OF LWR EXTRE W/O DYE: CPT | Mod: TC,PN,RT

## 2025-08-27 PROCEDURE — 73721 MRI JNT OF LWR EXTRE W/O DYE: CPT | Mod: 26,RT,, | Performed by: RADIOLOGY

## (undated) DEVICE — GOWN SMARTGOWN LVL4 X-LONG XL

## (undated) DEVICE — APPLICATOR CHLORAPREP ORN 26ML

## (undated) DEVICE — MAT SURGICAL ECOSUCTIONER

## (undated) DEVICE — PASSER QP LASSO SUT 45 CRV R

## (undated) DEVICE — DRAPE THREE-QTR REINF 53X77IN

## (undated) DEVICE — DRAPE T EXTRM SURG 121X128X90

## (undated) DEVICE — UNDERGLOVES BIOGEL PI SIZE 8.5

## (undated) DEVICE — TIP SUCTION YANKAUER

## (undated) DEVICE — SUT 3-0 MONOCRYL PLUS PS-2

## (undated) DEVICE — TUBING SUCTION STRAIGHT .25X20

## (undated) DEVICE — COVER CAMERA OPERATING ROOM

## (undated) DEVICE — ALCOHOL 70% ANTISEPTIC ISO 4OZ

## (undated) DEVICE — TOWEL OR DISP STRL BLUE 4/PK

## (undated) DEVICE — BLADE SHAVER TORPEDO 4MMX13CM

## (undated) DEVICE — GLOVE SURG ULTRA TOUCH 6

## (undated) DEVICE — BANDAGE ACE DOUBLE STER 6IN

## (undated) DEVICE — BLADE AVG MEDIUM 25 X 9

## (undated) DEVICE — UNDERGLOVES BIOGEL PI SZ 7 LF

## (undated) DEVICE — SOL IRR NACL .9% 3000ML

## (undated) DEVICE — POSITIONER HEAD DONUT 9IN FOAM

## (undated) DEVICE — SUPPORT ULNA NERVE PROTECTOR

## (undated) DEVICE — KIT TURNOVER

## (undated) DEVICE — TUBING PUMP ARTHROSCOPY STRL

## (undated) DEVICE — DRAPE U SPLIT SHEET 54X76IN

## (undated) DEVICE — GLOVE PROTEXIS LTX  8.5

## (undated) DEVICE — NDL BLNT STD 1.5IN 18G

## (undated) DEVICE — PAD ABDOMINAL STERILE 8X10IN

## (undated) DEVICE — COVER LIGHT HANDLE 80/CA

## (undated) DEVICE — PAD CAST SPECIALIST STRL 6

## (undated) DEVICE — ELECTRODE REM PLYHSV RETURN 9

## (undated) DEVICE — SOCKINETTE IMPERVIOUS 12X48IN

## (undated) DEVICE — UNDERGLOVES BIOGEL PI SZ 6 LF

## (undated) DEVICE — SPONGE COTTON TRAY 4X4IN

## (undated) DEVICE — COVER PROXIMA MAYO STAND

## (undated) DEVICE — PASSER QP LASSO SUT 45 CRV L

## (undated) DEVICE — MANIFOLD 4 PORT

## (undated) DEVICE — PACK BASIC SETUP SC BR

## (undated) DEVICE — GLOVE SURGICAL LATEX SZ 7